# Patient Record
Sex: FEMALE | Race: WHITE | Employment: OTHER | URBAN - METROPOLITAN AREA
[De-identification: names, ages, dates, MRNs, and addresses within clinical notes are randomized per-mention and may not be internally consistent; named-entity substitution may affect disease eponyms.]

---

## 2018-10-15 PROBLEM — C78.02 SECONDARY MALIGNANT NEOPLASM OF LEFT LUNG (HCC): Status: ACTIVE | Noted: 2018-10-15

## 2018-10-15 PROBLEM — C34.11 MALIGNANT NEOPLASM OF UPPER LOBE OF RIGHT LUNG (HCC): Status: ACTIVE | Noted: 2018-10-15

## 2018-11-27 DIAGNOSIS — C34.11 MALIGNANT NEOPLASM OF UPPER LOBE OF RIGHT LUNG (HCC): ICD-10-CM

## 2018-11-27 LAB
ALBUMIN SERPL-MCNC: 4.6 G/DL (ref 3.9–4.9)
ALP BLD-CCNC: 63 U/L (ref 40–130)
ALT SERPL-CCNC: 20 U/L (ref 0–33)
ANION GAP SERPL CALCULATED.3IONS-SCNC: 14 MEQ/L (ref 7–13)
AST SERPL-CCNC: 17 U/L (ref 0–35)
BILIRUB SERPL-MCNC: <0.2 MG/DL (ref 0–1.2)
BUN BLDV-MCNC: 33 MG/DL (ref 8–23)
CALCIUM SERPL-MCNC: 10 MG/DL (ref 8.6–10.2)
CHLORIDE BLD-SCNC: 97 MEQ/L (ref 98–107)
CO2: 25 MEQ/L (ref 22–29)
CREAT SERPL-MCNC: 0.85 MG/DL (ref 0.5–0.9)
GFR AFRICAN AMERICAN: >60
GFR NON-AFRICAN AMERICAN: >60
GLOBULIN: 3.4 G/DL (ref 2.3–3.5)
GLUCOSE BLD-MCNC: 101 MG/DL (ref 74–109)
POTASSIUM SERPL-SCNC: 4.7 MEQ/L (ref 3.5–5.1)
SODIUM BLD-SCNC: 136 MEQ/L (ref 132–144)
TOTAL PROTEIN: 8 G/DL (ref 6.4–8.1)

## 2018-12-18 DIAGNOSIS — C34.11 MALIGNANT NEOPLASM OF UPPER LOBE OF RIGHT LUNG (HCC): ICD-10-CM

## 2018-12-18 DIAGNOSIS — C78.02 SECONDARY MALIGNANT NEOPLASM OF LEFT LUNG (HCC): ICD-10-CM

## 2018-12-18 LAB
ALBUMIN SERPL-MCNC: 4.3 G/DL (ref 3.9–4.9)
ALP BLD-CCNC: 57 U/L (ref 40–130)
ALT SERPL-CCNC: 26 U/L (ref 0–33)
ANION GAP SERPL CALCULATED.3IONS-SCNC: 15 MEQ/L (ref 7–13)
AST SERPL-CCNC: 19 U/L (ref 0–35)
BILIRUB SERPL-MCNC: <0.2 MG/DL (ref 0–1.2)
BUN BLDV-MCNC: 27 MG/DL (ref 8–23)
CALCIUM SERPL-MCNC: 9.3 MG/DL (ref 8.6–10.2)
CHLORIDE BLD-SCNC: 100 MEQ/L (ref 98–107)
CO2: 23 MEQ/L (ref 22–29)
CREAT SERPL-MCNC: 0.85 MG/DL (ref 0.5–0.9)
GFR AFRICAN AMERICAN: >60
GFR NON-AFRICAN AMERICAN: >60
GLOBULIN: 2.8 G/DL (ref 2.3–3.5)
GLUCOSE BLD-MCNC: 97 MG/DL (ref 74–109)
POTASSIUM SERPL-SCNC: 4.5 MEQ/L (ref 3.5–5.1)
SODIUM BLD-SCNC: 138 MEQ/L (ref 132–144)
TOTAL PROTEIN: 7.1 G/DL (ref 6.4–8.1)
TSH SERPL DL<=0.05 MIU/L-ACNC: 0.13 UIU/ML (ref 0.27–4.2)

## 2019-01-08 DIAGNOSIS — C34.11 MALIGNANT NEOPLASM OF UPPER LOBE OF RIGHT LUNG (HCC): ICD-10-CM

## 2019-01-08 DIAGNOSIS — C78.02 SECONDARY MALIGNANT NEOPLASM OF LEFT LUNG (HCC): ICD-10-CM

## 2019-01-08 LAB
ALBUMIN SERPL-MCNC: 4.4 G/DL (ref 3.9–4.9)
ALP BLD-CCNC: 58 U/L (ref 40–130)
ALT SERPL-CCNC: 23 U/L (ref 0–33)
ANION GAP SERPL CALCULATED.3IONS-SCNC: 19 MEQ/L (ref 7–13)
AST SERPL-CCNC: 16 U/L (ref 0–35)
BILIRUB SERPL-MCNC: <0.2 MG/DL (ref 0–1.2)
BUN BLDV-MCNC: 30 MG/DL (ref 8–23)
CALCIUM SERPL-MCNC: 9.5 MG/DL (ref 8.6–10.2)
CHLORIDE BLD-SCNC: 100 MEQ/L (ref 98–107)
CO2: 20 MEQ/L (ref 22–29)
CREAT SERPL-MCNC: 0.95 MG/DL (ref 0.5–0.9)
GFR AFRICAN AMERICAN: >60
GFR NON-AFRICAN AMERICAN: 59.5
GLOBULIN: 2.8 G/DL (ref 2.3–3.5)
GLUCOSE BLD-MCNC: 102 MG/DL (ref 74–109)
POTASSIUM SERPL-SCNC: 4.2 MEQ/L (ref 3.5–5.1)
SODIUM BLD-SCNC: 139 MEQ/L (ref 132–144)
TOTAL PROTEIN: 7.2 G/DL (ref 6.4–8.1)
TSH SERPL DL<=0.05 MIU/L-ACNC: 0.14 UIU/ML (ref 0.27–4.2)

## 2019-01-18 ENCOUNTER — HOSPITAL ENCOUNTER (OUTPATIENT)
Dept: CT IMAGING | Age: 64
Discharge: HOME | End: 2019-01-20
Payer: OTHER GOVERNMENT

## 2019-01-18 DIAGNOSIS — C34.11 MALIGNANT NEOPLASM OF UPPER LOBE OF RIGHT LUNG (HCC): ICD-10-CM

## 2019-01-18 DIAGNOSIS — C78.02 SECONDARY MALIGNANT NEOPLASM OF LEFT LUNG (HCC): ICD-10-CM

## 2019-01-18 PROCEDURE — 6360000004 HC RX CONTRAST MEDICATION: Performed by: INTERNAL MEDICINE

## 2019-01-18 PROCEDURE — 71260 CT THORAX DX C+: CPT

## 2019-01-18 RX ADMIN — IOPAMIDOL 75 ML: 612 INJECTION, SOLUTION INTRAVENOUS at 15:03

## 2019-01-29 DIAGNOSIS — C34.11 MALIGNANT NEOPLASM OF UPPER LOBE OF RIGHT LUNG (HCC): ICD-10-CM

## 2019-01-29 LAB
ALBUMIN SERPL-MCNC: 4.7 G/DL (ref 3.9–4.9)
ALP BLD-CCNC: 66 U/L (ref 40–130)
ALT SERPL-CCNC: 26 U/L (ref 0–33)
ANION GAP SERPL CALCULATED.3IONS-SCNC: 18 MEQ/L (ref 7–13)
AST SERPL-CCNC: <5 U/L (ref 0–35)
BILIRUB SERPL-MCNC: <0.2 MG/DL (ref 0–1.2)
BUN BLDV-MCNC: 30 MG/DL (ref 8–23)
CALCIUM SERPL-MCNC: 9.9 MG/DL (ref 8.6–10.2)
CHLORIDE BLD-SCNC: 97 MEQ/L (ref 98–107)
CO2: 22 MEQ/L (ref 22–29)
CREAT SERPL-MCNC: 0.95 MG/DL (ref 0.5–0.9)
GFR AFRICAN AMERICAN: >60
GFR NON-AFRICAN AMERICAN: 59.5
GLOBULIN: 2.8 G/DL (ref 2.3–3.5)
GLUCOSE BLD-MCNC: 80 MG/DL (ref 74–109)
POTASSIUM SERPL-SCNC: 4.7 MEQ/L (ref 3.5–5.1)
SODIUM BLD-SCNC: 137 MEQ/L (ref 132–144)
TOTAL PROTEIN: 7.5 G/DL (ref 6.4–8.1)

## 2019-02-19 DIAGNOSIS — C34.11 MALIGNANT NEOPLASM OF UPPER LOBE OF RIGHT LUNG (HCC): ICD-10-CM

## 2019-02-19 LAB
ALBUMIN SERPL-MCNC: 4.2 G/DL (ref 3.5–4.6)
ALP BLD-CCNC: 55 U/L (ref 40–130)
ALT SERPL-CCNC: 22 U/L (ref 0–33)
ANION GAP SERPL CALCULATED.3IONS-SCNC: 17 MEQ/L (ref 9–15)
AST SERPL-CCNC: 25 U/L (ref 0–35)
BILIRUB SERPL-MCNC: <0.2 MG/DL (ref 0.2–0.7)
BUN BLDV-MCNC: 33 MG/DL (ref 8–23)
CALCIUM SERPL-MCNC: 9.6 MG/DL (ref 8.5–9.9)
CHLORIDE BLD-SCNC: 97 MEQ/L (ref 95–107)
CO2: 22 MEQ/L (ref 20–31)
CREAT SERPL-MCNC: 0.83 MG/DL (ref 0.5–0.9)
GFR AFRICAN AMERICAN: >60
GFR NON-AFRICAN AMERICAN: >60
GLOBULIN: 3 G/DL (ref 2.3–3.5)
GLUCOSE BLD-MCNC: 92 MG/DL (ref 70–99)
POTASSIUM SERPL-SCNC: 4.3 MEQ/L (ref 3.4–4.9)
SODIUM BLD-SCNC: 136 MEQ/L (ref 135–144)
T4 FREE: 0.88 NG/DL (ref 0.84–1.68)
TOTAL PROTEIN: 7.2 G/DL (ref 6.3–8)

## 2019-05-07 DIAGNOSIS — C34.11 MALIGNANT NEOPLASM OF UPPER LOBE OF RIGHT LUNG (HCC): ICD-10-CM

## 2019-05-07 LAB
ALBUMIN SERPL-MCNC: 4.3 G/DL (ref 3.5–4.6)
ALP BLD-CCNC: 64 U/L (ref 40–130)
ALT SERPL-CCNC: 23 U/L (ref 0–33)
ANION GAP SERPL CALCULATED.3IONS-SCNC: 19 MEQ/L (ref 9–15)
AST SERPL-CCNC: 28 U/L (ref 0–35)
BILIRUB SERPL-MCNC: <0.2 MG/DL (ref 0.2–0.7)
BUN BLDV-MCNC: 21 MG/DL (ref 8–23)
CALCIUM SERPL-MCNC: 9.5 MG/DL (ref 8.5–9.9)
CHLORIDE BLD-SCNC: 99 MEQ/L (ref 95–107)
CO2: 22 MEQ/L (ref 20–31)
CREAT SERPL-MCNC: 0.9 MG/DL (ref 0.5–0.9)
GFR AFRICAN AMERICAN: >60
GFR NON-AFRICAN AMERICAN: >60
GLOBULIN: 2.9 G/DL (ref 2.3–3.5)
GLUCOSE BLD-MCNC: 94 MG/DL (ref 70–99)
POTASSIUM SERPL-SCNC: 4.6 MEQ/L (ref 3.4–4.9)
SODIUM BLD-SCNC: 140 MEQ/L (ref 135–144)
TOTAL PROTEIN: 7.2 G/DL (ref 6.3–8)
TSH SERPL DL<=0.05 MIU/L-ACNC: 1.31 UIU/ML (ref 0.44–3.86)

## 2019-05-09 ENCOUNTER — HOSPITAL ENCOUNTER (OUTPATIENT)
Dept: INFUSION THERAPY | Age: 64
Setting detail: INFUSION SERIES
Discharge: HOME | End: 2019-05-09
Payer: OTHER GOVERNMENT

## 2019-05-09 VITALS
TEMPERATURE: 97.9 F | SYSTOLIC BLOOD PRESSURE: 132 MMHG | RESPIRATION RATE: 16 BRPM | BODY MASS INDEX: 37.43 KG/M2 | HEIGHT: 62 IN | WEIGHT: 203.4 LBS | HEART RATE: 111 BPM | DIASTOLIC BLOOD PRESSURE: 63 MMHG

## 2019-05-09 DIAGNOSIS — C34.11 MALIGNANT NEOPLASM OF UPPER LOBE OF RIGHT LUNG (HCC): Primary | ICD-10-CM

## 2019-05-09 PROCEDURE — 2580000003 HC RX 258

## 2019-05-09 PROCEDURE — 6360000002 HC RX W HCPCS: Performed by: INTERNAL MEDICINE

## 2019-05-09 PROCEDURE — 2580000003 HC RX 258: Performed by: INTERNAL MEDICINE

## 2019-05-09 PROCEDURE — 96409 CHEMO IV PUSH SNGL DRUG: CPT

## 2019-05-09 PROCEDURE — 96375 TX/PRO/DX INJ NEW DRUG ADDON: CPT

## 2019-05-09 PROCEDURE — 96413 CHEMO IV INFUSION 1 HR: CPT

## 2019-05-09 RX ORDER — SODIUM CHLORIDE 0.9 % (FLUSH) 0.9 %
10 SYRINGE (ML) INJECTION PRN
Status: DISCONTINUED | OUTPATIENT
Start: 2019-05-09 | End: 2019-05-10 | Stop reason: HOSPADM

## 2019-05-09 RX ORDER — HEPARIN SODIUM (PORCINE) LOCK FLUSH IV SOLN 100 UNIT/ML 100 UNIT/ML
500 SOLUTION INTRAVENOUS PRN
Status: DISCONTINUED | OUTPATIENT
Start: 2019-05-09 | End: 2019-05-10 | Stop reason: HOSPADM

## 2019-05-09 RX ORDER — SODIUM CHLORIDE 9 MG/ML
INJECTION, SOLUTION INTRAVENOUS
Status: COMPLETED
Start: 2019-05-09 | End: 2019-05-09

## 2019-05-09 RX ORDER — SODIUM CHLORIDE 9 MG/ML
INJECTION, SOLUTION INTRAVENOUS ONCE
Status: COMPLETED | OUTPATIENT
Start: 2019-05-09 | End: 2019-05-09

## 2019-05-09 RX ADMIN — SODIUM CHLORIDE 200 MG: 9 INJECTION, SOLUTION INTRAVENOUS at 10:19

## 2019-05-09 RX ADMIN — SODIUM CHLORIDE: 9 INJECTION, SOLUTION INTRAVENOUS at 09:50

## 2019-05-09 RX ADMIN — SODIUM CHLORIDE 1000 MG: 9 INJECTION, SOLUTION INTRAVENOUS at 10:55

## 2019-05-09 RX ADMIN — DEXAMETHASONE SODIUM PHOSPHATE 12 MG: 4 INJECTION, SOLUTION INTRA-ARTICULAR; INTRALESIONAL; INTRAMUSCULAR; INTRAVENOUS; SOFT TISSUE at 09:50

## 2019-05-09 NOTE — PROGRESS NOTES
Pt here for infusion. Vitals stable. Consents signed and placed in chart. Denies any pain at this time.

## 2019-05-09 NOTE — PROGRESS NOTES
Pt  To [facility] ambulatory for first time chemo. States received info from [facility] on meds. Consents signed.

## 2019-05-21 ENCOUNTER — HOSPITAL ENCOUNTER (OUTPATIENT)
Dept: CT IMAGING | Age: 64
Discharge: HOME | End: 2019-05-23
Payer: OTHER GOVERNMENT

## 2019-05-21 DIAGNOSIS — C34.11 MALIGNANT NEOPLASM OF UPPER LOBE OF RIGHT LUNG (HCC): ICD-10-CM

## 2019-05-21 PROCEDURE — 71250 CT THORAX DX C-: CPT

## 2019-05-30 ENCOUNTER — HOSPITAL ENCOUNTER (OUTPATIENT)
Dept: INFUSION THERAPY | Age: 64
Setting detail: INFUSION SERIES
Discharge: HOME | End: 2019-05-30
Payer: OTHER GOVERNMENT

## 2019-05-30 VITALS
DIASTOLIC BLOOD PRESSURE: 64 MMHG | RESPIRATION RATE: 16 BRPM | TEMPERATURE: 98.5 F | SYSTOLIC BLOOD PRESSURE: 106 MMHG | HEART RATE: 85 BPM

## 2019-05-30 DIAGNOSIS — C34.11 MALIGNANT NEOPLASM OF UPPER LOBE OF RIGHT LUNG (HCC): Primary | ICD-10-CM

## 2019-05-30 PROCEDURE — 2580000003 HC RX 258

## 2019-05-30 PROCEDURE — 6360000002 HC RX W HCPCS: Performed by: INTERNAL MEDICINE

## 2019-05-30 PROCEDURE — 96413 CHEMO IV INFUSION 1 HR: CPT

## 2019-05-30 PROCEDURE — 96417 CHEMO IV INFUS EACH ADDL SEQ: CPT

## 2019-05-30 PROCEDURE — 96375 TX/PRO/DX INJ NEW DRUG ADDON: CPT

## 2019-05-30 PROCEDURE — 2580000003 HC RX 258: Performed by: INTERNAL MEDICINE

## 2019-05-30 RX ORDER — SODIUM CHLORIDE 9 MG/ML
INJECTION, SOLUTION INTRAVENOUS
Status: COMPLETED
Start: 2019-05-30 | End: 2019-05-30

## 2019-05-30 RX ORDER — SODIUM CHLORIDE 9 MG/ML
INJECTION, SOLUTION INTRAVENOUS ONCE
Status: COMPLETED | OUTPATIENT
Start: 2019-05-30 | End: 2019-05-30

## 2019-05-30 RX ORDER — SODIUM CHLORIDE 0.9 % (FLUSH) 0.9 %
10 SYRINGE (ML) INJECTION PRN
Status: DISCONTINUED | OUTPATIENT
Start: 2019-05-30 | End: 2019-05-31 | Stop reason: HOSPADM

## 2019-05-30 RX ADMIN — SODIUM CHLORIDE: 9 INJECTION, SOLUTION INTRAVENOUS at 14:11

## 2019-05-30 RX ADMIN — SODIUM CHLORIDE 200 MG: 9 INJECTION, SOLUTION INTRAVENOUS at 14:39

## 2019-05-30 RX ADMIN — DEXAMETHASONE SODIUM PHOSPHATE 12 MG: 10 INJECTION, SOLUTION INTRAMUSCULAR; INTRAVENOUS at 14:13

## 2019-05-30 RX ADMIN — SODIUM CHLORIDE 1000 MG: 9 INJECTION, SOLUTION INTRAVENOUS at 15:13

## 2019-06-18 ENCOUNTER — TELEPHONE (OUTPATIENT)
Dept: ADMINISTRATIVE | Age: 64
End: 2019-06-18

## 2019-06-21 ENCOUNTER — HOSPITAL ENCOUNTER (OUTPATIENT)
Dept: INFUSION THERAPY | Age: 64
Setting detail: INFUSION SERIES
Discharge: HOME | End: 2019-06-21
Payer: OTHER GOVERNMENT

## 2019-06-21 VITALS
SYSTOLIC BLOOD PRESSURE: 132 MMHG | DIASTOLIC BLOOD PRESSURE: 66 MMHG | HEART RATE: 95 BPM | RESPIRATION RATE: 18 BRPM | TEMPERATURE: 98.1 F

## 2019-06-21 DIAGNOSIS — C78.02 SECONDARY MALIGNANT NEOPLASM OF LEFT LUNG (HCC): ICD-10-CM

## 2019-06-21 DIAGNOSIS — C34.11 MALIGNANT NEOPLASM OF UPPER LOBE OF RIGHT LUNG (HCC): Primary | ICD-10-CM

## 2019-06-21 PROCEDURE — 96411 CHEMO IV PUSH ADDL DRUG: CPT

## 2019-06-21 PROCEDURE — 96413 CHEMO IV INFUSION 1 HR: CPT

## 2019-06-21 PROCEDURE — 6360000002 HC RX W HCPCS: Performed by: INTERNAL MEDICINE

## 2019-06-21 PROCEDURE — 2580000003 HC RX 258

## 2019-06-21 PROCEDURE — 2580000003 HC RX 258: Performed by: INTERNAL MEDICINE

## 2019-06-21 PROCEDURE — 96375 TX/PRO/DX INJ NEW DRUG ADDON: CPT

## 2019-06-21 RX ORDER — SODIUM CHLORIDE 9 MG/ML
INJECTION, SOLUTION INTRAVENOUS
Status: DISPENSED
Start: 2019-06-21 | End: 2019-06-22

## 2019-06-21 RX ORDER — SODIUM CHLORIDE 9 MG/ML
INJECTION, SOLUTION INTRAVENOUS ONCE
Status: COMPLETED | OUTPATIENT
Start: 2019-06-21 | End: 2019-06-21

## 2019-06-21 RX ORDER — SODIUM CHLORIDE 0.9 % (FLUSH) 0.9 %
10 SYRINGE (ML) INJECTION PRN
Status: DISCONTINUED | OUTPATIENT
Start: 2019-06-21 | End: 2019-06-22 | Stop reason: HOSPADM

## 2019-06-21 RX ADMIN — SODIUM CHLORIDE 200 MG: 9 INJECTION, SOLUTION INTRAVENOUS at 14:28

## 2019-06-21 RX ADMIN — DEXAMETHASONE SODIUM PHOSPHATE 12 MG: 4 INJECTION, SOLUTION INTRAMUSCULAR; INTRAVENOUS at 13:59

## 2019-06-21 RX ADMIN — SODIUM CHLORIDE: 9 INJECTION, SOLUTION INTRAVENOUS at 14:00

## 2019-06-21 RX ADMIN — SODIUM CHLORIDE 1000 MG: 9 INJECTION, SOLUTION INTRAVENOUS at 15:07

## 2019-07-17 ENCOUNTER — HOSPITAL ENCOUNTER (OUTPATIENT)
Dept: INFUSION THERAPY | Age: 64
Setting detail: INFUSION SERIES
Discharge: HOME | End: 2019-07-17
Payer: OTHER GOVERNMENT

## 2019-07-17 VITALS
RESPIRATION RATE: 18 BRPM | DIASTOLIC BLOOD PRESSURE: 70 MMHG | TEMPERATURE: 97.8 F | SYSTOLIC BLOOD PRESSURE: 153 MMHG | HEART RATE: 109 BPM

## 2019-07-17 DIAGNOSIS — C34.11 MALIGNANT NEOPLASM OF UPPER LOBE OF RIGHT LUNG (HCC): Primary | ICD-10-CM

## 2019-07-17 DIAGNOSIS — C78.02 SECONDARY MALIGNANT NEOPLASM OF LEFT LUNG (HCC): ICD-10-CM

## 2019-07-17 PROCEDURE — 96372 THER/PROPH/DIAG INJ SC/IM: CPT

## 2019-07-17 PROCEDURE — 96417 CHEMO IV INFUS EACH ADDL SEQ: CPT

## 2019-07-17 PROCEDURE — 96375 TX/PRO/DX INJ NEW DRUG ADDON: CPT

## 2019-07-17 PROCEDURE — 2580000003 HC RX 258: Performed by: INTERNAL MEDICINE

## 2019-07-17 PROCEDURE — 96413 CHEMO IV INFUSION 1 HR: CPT

## 2019-07-17 PROCEDURE — 6360000002 HC RX W HCPCS: Performed by: INTERNAL MEDICINE

## 2019-07-17 PROCEDURE — 2580000003 HC RX 258

## 2019-07-17 RX ORDER — SODIUM CHLORIDE 9 MG/ML
INJECTION, SOLUTION INTRAVENOUS
Status: COMPLETED
Start: 2019-07-17 | End: 2019-07-17

## 2019-07-17 RX ORDER — SODIUM CHLORIDE 0.9 % (FLUSH) 0.9 %
10 SYRINGE (ML) INJECTION PRN
Status: DISCONTINUED | OUTPATIENT
Start: 2019-07-17 | End: 2019-07-18 | Stop reason: HOSPADM

## 2019-07-17 RX ORDER — SODIUM CHLORIDE 9 MG/ML
INJECTION, SOLUTION INTRAVENOUS ONCE
Status: COMPLETED | OUTPATIENT
Start: 2019-07-17 | End: 2019-07-17

## 2019-07-17 RX ORDER — CYANOCOBALAMIN 1000 UG/ML
1000 INJECTION INTRAMUSCULAR; SUBCUTANEOUS ONCE
Status: COMPLETED | OUTPATIENT
Start: 2019-07-17 | End: 2019-07-17

## 2019-07-17 RX ADMIN — DEXAMETHASONE SODIUM PHOSPHATE 12 MG: 10 INJECTION, SOLUTION INTRAMUSCULAR; INTRAVENOUS at 13:25

## 2019-07-17 RX ADMIN — SODIUM CHLORIDE 250 ML: 9 INJECTION, SOLUTION INTRAVENOUS at 13:24

## 2019-07-17 RX ADMIN — CYANOCOBALAMIN 1000 MCG: 1000 INJECTION, SOLUTION INTRAMUSCULAR; SUBCUTANEOUS at 13:24

## 2019-07-17 RX ADMIN — SODIUM CHLORIDE 200 MG: 9 INJECTION, SOLUTION INTRAVENOUS at 14:20

## 2019-07-17 RX ADMIN — SODIUM CHLORIDE 1000 MG: 9 INJECTION, SOLUTION INTRAVENOUS at 14:52

## 2019-07-17 NOTE — FLOWSHEET NOTE
Patient to the floor ambulatory for her cycle 15 chemo. Vital signs taken. Denies any discomfort. Call light within reach.

## 2023-02-08 PROBLEM — M81.0 MENOPAUSAL OSTEOPOROSIS: Status: ACTIVE | Noted: 2023-02-08

## 2023-02-08 PROBLEM — K63.5 COLON POLYP: Status: ACTIVE | Noted: 2023-02-08

## 2023-02-08 PROBLEM — R68.89 FLU-LIKE SYMPTOMS: Status: ACTIVE | Noted: 2023-02-08

## 2023-02-08 PROBLEM — R07.81 RIB PAIN ON RIGHT SIDE: Status: ACTIVE | Noted: 2023-02-08

## 2023-02-08 PROBLEM — M54.6 PAIN IN THORACIC SPINE: Status: ACTIVE | Noted: 2023-02-08

## 2023-02-08 PROBLEM — C34.90 LUNG CANCER (MULTI): Status: ACTIVE | Noted: 2023-02-08

## 2023-02-08 PROBLEM — M16.11 ARTHRITIS OF RIGHT HIP: Status: ACTIVE | Noted: 2023-02-08

## 2023-02-08 PROBLEM — I10 HYPERTENSION: Status: ACTIVE | Noted: 2023-02-08

## 2023-02-08 PROBLEM — M25.559 PAIN IN HIP JOINT: Status: ACTIVE | Noted: 2023-02-08

## 2023-02-08 PROBLEM — K52.832 LYMPHOCYTIC COLITIS: Status: ACTIVE | Noted: 2023-02-08

## 2023-02-08 PROBLEM — R10.9 ABDOMINAL CRAMPS: Status: ACTIVE | Noted: 2023-02-08

## 2023-02-08 PROBLEM — M25.569 KNEE PAIN: Status: ACTIVE | Noted: 2023-02-08

## 2023-02-08 PROBLEM — B37.0 THRUSH: Status: ACTIVE | Noted: 2023-02-08

## 2023-02-08 PROBLEM — M54.16 LUMBAR RADICULOPATHY, RIGHT: Status: ACTIVE | Noted: 2023-02-08

## 2023-02-08 PROBLEM — M70.70 HIP BURSITIS: Status: ACTIVE | Noted: 2023-02-08

## 2023-02-08 PROBLEM — F41.9 ANXIETY: Status: ACTIVE | Noted: 2023-02-08

## 2023-02-08 PROBLEM — M19.90 ARTHRITIS: Status: ACTIVE | Noted: 2023-02-08

## 2023-02-08 PROBLEM — J30.9 ALLERGIC RHINITIS: Status: ACTIVE | Noted: 2023-02-08

## 2023-02-08 PROBLEM — M17.9 KNEE OSTEOARTHRITIS: Status: ACTIVE | Noted: 2023-02-08

## 2023-02-08 PROBLEM — E78.5 HYPERLIPIDEMIA: Status: ACTIVE | Noted: 2023-02-08

## 2023-02-08 PROBLEM — R55 SYNCOPE AND COLLAPSE: Status: ACTIVE | Noted: 2023-02-08

## 2023-02-08 PROBLEM — F32.A DEPRESSED: Status: ACTIVE | Noted: 2023-02-08

## 2023-02-08 PROBLEM — R91.1 LUNG NODULE: Status: ACTIVE | Noted: 2023-02-08

## 2023-02-08 PROBLEM — R05.8 RECURRENT COUGH: Status: ACTIVE | Noted: 2023-02-08

## 2023-02-08 PROBLEM — J44.9 COPD (CHRONIC OBSTRUCTIVE PULMONARY DISEASE) (MULTI): Status: ACTIVE | Noted: 2023-02-08

## 2023-02-08 PROBLEM — R19.7 DIARRHEA: Status: ACTIVE | Noted: 2023-02-08

## 2023-02-08 RX ORDER — FLUTICASONE PROPIONATE AND SALMETEROL XINAFOATE 230; 21 UG/1; UG/1
AEROSOL, METERED RESPIRATORY (INHALATION)
COMMUNITY
End: 2023-04-04 | Stop reason: SDUPTHER

## 2023-02-08 RX ORDER — ALBUTEROL SULFATE 90 UG/1
1 AEROSOL, METERED RESPIRATORY (INHALATION) EVERY 4 HOURS PRN
COMMUNITY
End: 2023-04-04 | Stop reason: SDUPTHER

## 2023-02-08 RX ORDER — FLUTICASONE FUROATE, UMECLIDINIUM BROMIDE AND VILANTEROL TRIFENATATE 200; 62.5; 25 UG/1; UG/1; UG/1
POWDER RESPIRATORY (INHALATION)
COMMUNITY
Start: 2021-09-27 | End: 2023-04-04 | Stop reason: SDUPTHER

## 2023-02-08 RX ORDER — MULTIVITAMIN
1 TABLET ORAL DAILY
COMMUNITY

## 2023-02-08 RX ORDER — PREDNISONE 20 MG/1
TABLET ORAL
COMMUNITY
Start: 2020-10-01 | End: 2023-03-17 | Stop reason: SDUPTHER

## 2023-02-08 RX ORDER — MONTELUKAST SODIUM 10 MG/1
1 TABLET ORAL DAILY
COMMUNITY
Start: 2021-03-23 | End: 2023-04-04 | Stop reason: SDUPTHER

## 2023-02-08 RX ORDER — ALPRAZOLAM 0.5 MG/1
1 TABLET ORAL 3 TIMES DAILY
COMMUNITY

## 2023-02-08 RX ORDER — ASPIRIN 81 MG/1
1 TABLET ORAL DAILY
COMMUNITY

## 2023-02-08 RX ORDER — LISINOPRIL AND HYDROCHLOROTHIAZIDE 12.5; 2 MG/1; MG/1
1 TABLET ORAL DAILY
COMMUNITY
End: 2023-04-04 | Stop reason: SDUPTHER

## 2023-02-08 RX ORDER — CETIRIZINE HYDROCHLORIDE 10 MG/1
1 TABLET ORAL DAILY
COMMUNITY
Start: 2019-07-31 | End: 2023-04-04 | Stop reason: SDUPTHER

## 2023-02-08 RX ORDER — AZITHROMYCIN 500 MG/1
TABLET, FILM COATED ORAL
COMMUNITY
Start: 2021-09-27 | End: 2023-04-04 | Stop reason: ALTCHOICE

## 2023-02-08 RX ORDER — DULOXETIN HYDROCHLORIDE 60 MG/1
CAPSULE, DELAYED RELEASE ORAL
COMMUNITY
End: 2023-04-04 | Stop reason: SDUPTHER

## 2023-02-08 RX ORDER — SIMVASTATIN 40 MG/1
1 TABLET, FILM COATED ORAL NIGHTLY
COMMUNITY
End: 2023-04-04 | Stop reason: SDUPTHER

## 2023-03-16 ENCOUNTER — TELEPHONE (OUTPATIENT)
Dept: PRIMARY CARE | Facility: CLINIC | Age: 68
End: 2023-03-16
Payer: MEDICARE

## 2023-03-16 DIAGNOSIS — J44.9 CHRONIC OBSTRUCTIVE PULMONARY DISEASE, UNSPECIFIED COPD TYPE (MULTI): Primary | ICD-10-CM

## 2023-03-17 RX ORDER — TRAZODONE HYDROCHLORIDE 100 MG/1
100 TABLET ORAL NIGHTLY
COMMUNITY
Start: 2023-02-09 | End: 2023-04-04 | Stop reason: ALTCHOICE

## 2023-03-17 RX ORDER — PREDNISONE 20 MG/1
TABLET ORAL
Qty: 10 TABLET | Refills: 0 | Status: SHIPPED | OUTPATIENT
Start: 2023-03-17 | End: 2023-04-04 | Stop reason: SDUPTHER

## 2023-03-17 RX ORDER — TRAZODONE HYDROCHLORIDE 50 MG/1
50 TABLET ORAL NIGHTLY
COMMUNITY
Start: 2023-03-09

## 2023-03-17 RX ORDER — TOPIRAMATE 25 MG/1
1 TABLET ORAL 2 TIMES DAILY
COMMUNITY
Start: 2023-02-12

## 2023-03-17 RX ORDER — TIOTROPIUM BROMIDE 18 UG/1
CAPSULE ORAL; RESPIRATORY (INHALATION)
COMMUNITY
End: 2023-04-04 | Stop reason: SDUPTHER

## 2023-03-21 ENCOUNTER — APPOINTMENT (OUTPATIENT)
Dept: PRIMARY CARE | Facility: CLINIC | Age: 68
End: 2023-03-21
Payer: MEDICARE

## 2023-03-21 NOTE — TELEPHONE ENCOUNTER
Pt not feeling well requesting ATB so she doesn't get Pneumonia as you are well aware of her history she states     Sent to front for Virtual -

## 2023-04-04 ENCOUNTER — OFFICE VISIT (OUTPATIENT)
Dept: PRIMARY CARE | Facility: CLINIC | Age: 68
End: 2023-04-04
Payer: MEDICARE

## 2023-04-04 VITALS
DIASTOLIC BLOOD PRESSURE: 68 MMHG | OXYGEN SATURATION: 97 % | HEART RATE: 97 BPM | HEIGHT: 62 IN | TEMPERATURE: 97 F | RESPIRATION RATE: 18 BRPM | WEIGHT: 163 LBS | SYSTOLIC BLOOD PRESSURE: 107 MMHG | BODY MASS INDEX: 30 KG/M2

## 2023-04-04 DIAGNOSIS — M81.0 MENOPAUSAL OSTEOPOROSIS: ICD-10-CM

## 2023-04-04 DIAGNOSIS — J44.9 CHRONIC OBSTRUCTIVE PULMONARY DISEASE, UNSPECIFIED COPD TYPE (MULTI): ICD-10-CM

## 2023-04-04 DIAGNOSIS — C34.11 MALIGNANT NEOPLASM OF UPPER LOBE OF RIGHT LUNG (MULTI): ICD-10-CM

## 2023-04-04 DIAGNOSIS — I10 PRIMARY HYPERTENSION: Primary | ICD-10-CM

## 2023-04-04 DIAGNOSIS — F32.A DEPRESSION, UNSPECIFIED DEPRESSION TYPE: ICD-10-CM

## 2023-04-04 DIAGNOSIS — R19.7 DIARRHEA, UNSPECIFIED TYPE: ICD-10-CM

## 2023-04-04 DIAGNOSIS — Z12.31 BREAST CANCER SCREENING BY MAMMOGRAM: ICD-10-CM

## 2023-04-04 DIAGNOSIS — E78.5 HYPERLIPIDEMIA, UNSPECIFIED HYPERLIPIDEMIA TYPE: ICD-10-CM

## 2023-04-04 DIAGNOSIS — J30.9 ALLERGIC RHINITIS, UNSPECIFIED SEASONALITY, UNSPECIFIED TRIGGER: ICD-10-CM

## 2023-04-04 DIAGNOSIS — H60.399 ACUTE BACTERIAL OTITIS EXTERNA: ICD-10-CM

## 2023-04-04 DIAGNOSIS — M54.16 LUMBAR RADICULOPATHY, RIGHT: ICD-10-CM

## 2023-04-04 DIAGNOSIS — K63.5 POLYP OF COLON, UNSPECIFIED PART OF COLON, UNSPECIFIED TYPE: ICD-10-CM

## 2023-04-04 PROBLEM — C78.02 SECONDARY MALIGNANT NEOPLASM OF LEFT LUNG (MULTI): Status: ACTIVE | Noted: 2018-10-15

## 2023-04-04 PROCEDURE — G0009 ADMIN PNEUMOCOCCAL VACCINE: HCPCS | Performed by: FAMILY MEDICINE

## 2023-04-04 PROCEDURE — 99214 OFFICE O/P EST MOD 30 MIN: CPT | Performed by: FAMILY MEDICINE

## 2023-04-04 PROCEDURE — 1160F RVW MEDS BY RX/DR IN RCRD: CPT | Performed by: FAMILY MEDICINE

## 2023-04-04 PROCEDURE — 1159F MED LIST DOCD IN RCRD: CPT | Performed by: FAMILY MEDICINE

## 2023-04-04 PROCEDURE — 3078F DIAST BP <80 MM HG: CPT | Performed by: FAMILY MEDICINE

## 2023-04-04 PROCEDURE — 3074F SYST BP LT 130 MM HG: CPT | Performed by: FAMILY MEDICINE

## 2023-04-04 PROCEDURE — 90677 PCV20 VACCINE IM: CPT | Performed by: FAMILY MEDICINE

## 2023-04-04 RX ORDER — TRAZODONE HYDROCHLORIDE 50 MG/1
50 TABLET ORAL NIGHTLY
Qty: 90 TABLET | Refills: 3 | Status: CANCELLED | OUTPATIENT
Start: 2023-04-04

## 2023-04-04 RX ORDER — SIMVASTATIN 40 MG/1
40 TABLET, FILM COATED ORAL NIGHTLY
Qty: 90 TABLET | Refills: 3 | Status: SHIPPED | OUTPATIENT
Start: 2023-04-04

## 2023-04-04 RX ORDER — PREDNISONE 20 MG/1
20 TABLET ORAL
Qty: 10 TABLET | Refills: 2 | Status: SHIPPED | OUTPATIENT
Start: 2023-04-04 | End: 2023-10-26 | Stop reason: ALTCHOICE

## 2023-04-04 RX ORDER — FLUTICASONE FUROATE, UMECLIDINIUM BROMIDE AND VILANTEROL TRIFENATATE 200; 62.5; 25 UG/1; UG/1; UG/1
1 POWDER RESPIRATORY (INHALATION) DAILY
Qty: 90 EACH | Refills: 3 | Status: SHIPPED | OUTPATIENT
Start: 2023-04-04 | End: 2023-07-10 | Stop reason: SDUPTHER

## 2023-04-04 RX ORDER — ALBUTEROL SULFATE 90 UG/1
1 AEROSOL, METERED RESPIRATORY (INHALATION) EVERY 4 HOURS PRN
Qty: 18 G | Refills: 3 | Status: SHIPPED | OUTPATIENT
Start: 2023-04-04

## 2023-04-04 RX ORDER — ACETIC ACID 20.65 MG/ML
3 SOLUTION AURICULAR (OTIC) 3 TIMES DAILY
Qty: 15 ML | Refills: 2 | Status: SHIPPED | OUTPATIENT
Start: 2023-04-04 | End: 2024-01-02

## 2023-04-04 RX ORDER — LISINOPRIL AND HYDROCHLOROTHIAZIDE 12.5; 2 MG/1; MG/1
1 TABLET ORAL DAILY
Qty: 90 TABLET | Refills: 3 | Status: SHIPPED | OUTPATIENT
Start: 2023-04-04

## 2023-04-04 RX ORDER — CETIRIZINE HYDROCHLORIDE 10 MG/1
10 TABLET ORAL DAILY
Qty: 90 TABLET | Refills: 3 | Status: SHIPPED | OUTPATIENT
Start: 2023-04-04

## 2023-04-04 RX ORDER — TIOTROPIUM BROMIDE 18 UG/1
1 CAPSULE ORAL; RESPIRATORY (INHALATION)
Qty: 90 CAPSULE | Refills: 3 | Status: SHIPPED | OUTPATIENT
Start: 2023-04-04 | End: 2023-07-10

## 2023-04-04 RX ORDER — ALPRAZOLAM 0.5 MG/1
0.5 TABLET ORAL 3 TIMES DAILY
Qty: 7 TABLET | Refills: 3 | Status: CANCELLED | OUTPATIENT
Start: 2023-04-04

## 2023-04-04 RX ORDER — FLUTICASONE PROPIONATE AND SALMETEROL XINAFOATE 230; 21 UG/1; UG/1
2 AEROSOL, METERED RESPIRATORY (INHALATION)
Qty: 12 G | Refills: 3 | Status: SHIPPED | OUTPATIENT
Start: 2023-04-04

## 2023-04-04 RX ORDER — TOPIRAMATE 25 MG/1
25 TABLET ORAL 2 TIMES DAILY
Qty: 180 TABLET | Refills: 3 | Status: CANCELLED | OUTPATIENT
Start: 2023-04-04

## 2023-04-04 RX ORDER — DULOXETIN HYDROCHLORIDE 60 MG/1
60 CAPSULE, DELAYED RELEASE ORAL DAILY
Qty: 90 CAPSULE | Refills: 3 | Status: CANCELLED | OUTPATIENT
Start: 2023-04-04 | End: 2024-04-03

## 2023-04-04 RX ORDER — ALBUTEROL SULFATE 0.83 MG/ML
2.5 SOLUTION RESPIRATORY (INHALATION) EVERY 6 HOURS PRN
Qty: 180 ML | Refills: 3 | Status: SHIPPED | OUTPATIENT
Start: 2023-04-04 | End: 2024-04-03

## 2023-04-04 RX ORDER — AZITHROMYCIN 500 MG/1
500 TABLET, FILM COATED ORAL DAILY
Qty: 5 TABLET | Refills: 1 | Status: SHIPPED | OUTPATIENT
Start: 2023-04-04 | End: 2023-10-26 | Stop reason: ALTCHOICE

## 2023-04-04 RX ORDER — DULOXETIN HYDROCHLORIDE 60 MG/1
60 CAPSULE, DELAYED RELEASE ORAL DAILY
Qty: 90 CAPSULE | Refills: 3 | Status: SHIPPED | OUTPATIENT
Start: 2023-04-04 | End: 2023-07-10 | Stop reason: SDUPTHER

## 2023-04-04 RX ORDER — MONTELUKAST SODIUM 10 MG/1
10 TABLET ORAL DAILY
Qty: 90 TABLET | Refills: 3 | Status: SHIPPED | OUTPATIENT
Start: 2023-04-04

## 2023-04-04 ASSESSMENT — ENCOUNTER SYMPTOMS
DYSURIA: 0
SEIZURES: 0
DIARRHEA: 0
CONSTIPATION: 0
AGITATION: 0
POLYDIPSIA: 0
UNEXPECTED WEIGHT CHANGE: 0
FEVER: 0
CHEST TIGHTNESS: 0
DIFFICULTY URINATING: 0
POLYPHAGIA: 0
SHORTNESS OF BREATH: 1
ARTHRALGIAS: 0
PALPITATIONS: 0
WHEEZING: 1
ABDOMINAL DISTENTION: 0
LIGHT-HEADEDNESS: 0
SINUS PAIN: 0
ADENOPATHY: 0
BLOOD IN STOOL: 0
SPEECH DIFFICULTY: 0
CHILLS: 0
NECK PAIN: 0
BRUISES/BLEEDS EASILY: 0
DIZZINESS: 0
CONFUSION: 0
EYE REDNESS: 0
TROUBLE SWALLOWING: 0
NERVOUS/ANXIOUS: 0

## 2023-04-04 NOTE — PROGRESS NOTES
Subjective   Patient ID: Idania Orozco is a 67 y.o. female who presents for Follow-up (Check up/).    HPI   Patient is also here for follow-up of hypertension.. Symptoms do not include headache confusion visual disturbance dizziness shortness of breath chest pain syncope or palpitations. Recent blood pressure patient has  been checking.120/70 By report there is good compliance with treatment. Pertinent medical history includes  hyperlipidemia   Patient is also here for follow-up of hyperlipidemia. The most recent measurements for this patient would take I???   triglycerides       .... Associated symptoms do not include chest pain, Cramps with exertion, myalgias or nausea. Current treatment includes statins. By report there is good compliance with treatment. Pertinent medical history includes  hypertension   HERE  FROR F/U   OF COPD .Management last visit....   Symptoms include dyspnea and nonproductive cough. Onset was gradual year (S) ago. The symptoms occur constantly. The episodes occur daily and are described as moderate in severity and resolve. Associated symptoms do not include fever hemoptysis or leg edema. Current treatment includes Albuterol and long-acting steroids and ipratropium .. years      Review of Systems   Constitutional:  Negative for chills, fever and unexpected weight change.   HENT:  Positive for ear pain. Negative for congestion, hearing loss, nosebleeds, sinus pain and trouble swallowing.    Eyes:  Negative for redness and visual disturbance.   Respiratory:  Positive for shortness of breath and wheezing. Negative for chest tightness.    Cardiovascular:  Negative for chest pain and palpitations.   Gastrointestinal:  Negative for abdominal distention, blood in stool, constipation and diarrhea.   Endocrine: Negative for cold intolerance, heat intolerance, polydipsia, polyphagia and polyuria.   Genitourinary:  Negative for difficulty urinating, dysuria and urgency.   Musculoskeletal:   "Negative for arthralgias and neck pain.   Skin:  Negative for rash.   Neurological:  Negative for dizziness, seizures, syncope, speech difficulty and light-headedness.   Hematological:  Negative for adenopathy. Does not bruise/bleed easily.   Psychiatric/Behavioral:  Negative for agitation and confusion. The patient is not nervous/anxious.        Objective   /68   Pulse 97   Temp 36.1 °C (97 °F)   Resp 18   Ht 1.575 m (5' 2\")   Wt 73.9 kg (163 lb)   SpO2 97%   BMI 29.81 kg/m²     Physical Exam  Vitals reviewed.   Constitutional:       Appearance: Normal appearance.   HENT:      Head: Normocephalic.      Comments: Erythema of the external auditory canal     Right Ear: External ear normal.      Left Ear: External ear normal.      Nose: Nose normal.      Mouth/Throat:      Mouth: Mucous membranes are moist.   Eyes:      Conjunctiva/sclera: Conjunctivae normal.   Cardiovascular:      Rate and Rhythm: Regular rhythm.      Heart sounds: Normal heart sounds.   Pulmonary:      Effort: Pulmonary effort is normal.      Breath sounds: Wheezing present.   Abdominal:      General: Bowel sounds are normal.      Palpations: Abdomen is soft.   Musculoskeletal:         General: Normal range of motion.      Cervical back: Neck supple.   Skin:     General: Skin is warm and dry.   Neurological:      General: No focal deficit present.      Mental Status: She is alert and oriented to person, place, and time.   Psychiatric:         Behavior: Behavior normal.         Judgment: Judgment normal.     Colonoscopy dated 3/4/2021 showed 5 mm polyp in the descending colon few small mouth diverticuli found in the sigmoid colon repeat colonoscopy in 5 years echocardiogram dated 11/20 showed left ventricular systolic function is normal left atrium is mildly dilated there is no evidence of aortic valve regurgitation mitral valve is normal.  Relaxation of the left ventricular diastolic filling was noted with mild to moderate tricuspid " regurgitation    CT of the chest dated 11/30/2022 showed hypodense right thyroid nodule measuring 14 x 12 mm not significantly changed no mass or pneumonia in either lung noted stable left upper lobe nodule measuring 3 mm in diameter scant mediastinal adenopathy with lymph nodes measuring 10 mm or less  Assessment/Plan   Problem List Items Addressed This Visit          Nervous    Lumbar radiculopathy, right    Relevant Medications    DULoxetine (Cymbalta) 60 mg DR capsule       Respiratory    COPD (chronic obstructive pulmonary disease) (CMS/HCC)    Relevant Medications    albuterol 90 mcg/actuation inhaler    tiotropium (Spiriva with HandiHaler) 18 mcg inhalation capsule    montelukast (Singulair) 10 mg tablet    fluticasone-umeclidin-vilanter (Trelegy Ellipta) 200-62.5-25 mcg blister with device    fluticasone propion-salmeteroL (Advair HFA) 230-21 mcg/actuation inhaler    albuterol 2.5 mg /3 mL (0.083 %) nebulizer solution    azithromycin (Zithromax) 500 mg tablet    predniSONE (Deltasone) 20 mg tablet    Malignant neoplasm of upper lobe of right lung (CMS/HCC)       light of his history of's lung cancers discussed smoking cessation and encouraged consider nicotine lozenges            Circulatory    Hypertension - Primary     Patient is also here for follow-up of hypertension.. Symptoms do not include headache confusion visual disturbance dizziness shortness of breath chest pain syncope or palpitations. Recent blood pressure patient has  been checking. stable and continue meds          Relevant Medications    lisinopriL-hydrochlorothiazide 20-12.5 mg tablet       Digestive    Colon polyp     Colonoscopy reviewed see below repeat 2026, and diverticuli would recommend history use of Metamucil daily.  And avoid peanuts              Musculoskeletal    Menopausal osteoporosis    Relevant Orders    XR DEXA bone density       Other    Allergic rhinitis    Relevant Medications    cetirizine (ZyrTEC) 10 mg tablet     Diarrhea     In light of bowel movement change consider adding probiotic or FODMAP diet handout given         Relevant Orders    CBC and Auto Differential    Comprehensive Metabolic Panel    Hyperlipidemia     With review of the chart it seems like the lipid review has been quite sometime ago so we will order repeat fasting labs encourage you to get those    done   . ensure stability.  Call with results         Relevant Medications    simvastatin (Zocor) 40 mg tablet    Other Relevant Orders    Lipid Panel    Depression     Other Visit Diagnoses       Breast cancer screening by mammogram        Relevant Orders    BI mammo bilateral screening tomosynthesis    Acute bacterial otitis externa        Relevant Medications    acetic acid (Vosol) 2 % otic solution

## 2023-04-04 NOTE — ASSESSMENT & PLAN NOTE
light of his history of's lung cancers discussed smoking cessation and encouraged consider nicotine lozenges

## 2023-04-04 NOTE — PATIENT INSTRUCTIONS
Please consider exercise program involving walking or some other form of aerobic activity 5 days weekly for 30 minutes... Let's also consider strengthening of large muscle groups like the abdominal muscles or shoulder muscles... Twice weekly with reps of 5/10/15 exercises and gradually increase strength.. This is not heavy strength training but light weight training... Sit ups or back exercise routine.. Please ask for handout if uncertain how to do..This  will help to strengthen your muscles which in turn will help you to lose weight.... You might ask what is the best diet available.. I would strongly encourage you to consider  Weight Watchers.. And as  your  fellow on  Weight Watchers physician attempting to  live this  LIFE  style  choice with you....  I will be glad to give you recommendations on what to eat.. Consider buying Allison bread.., jayden bagle thin bread.. oikos yogurt... eggs  to eat as hard-boiled... Halo top ice cream for snack... All these are delicious foods which.. when eaten and  being compliant eating three  meals daily  breakfast lunch and dinner, drinking  64 ounces of water daily we will all win together !!!!!!!. This will be a means for you to lose weight... Consider also the smart phone raffaele ... My plate.. Or My  fitness  pal..,  as additional possibilities for weight loss... Good  luckarl Vasquez!    Discussed medication side effects.  The  risk benefits and treatment options  discussed with patient.  Better or so we added his name at    Please schedule follow-up appointment based upon your improvement/failure to improve/chronic medical conditions and physician recommendations during office appointment at the .  For lesion, open ended    Patient advised to go to er if symptoms worsen or to call answering service, or to return to office for additional evaluation    This note was partially  generated using Dragon voice recognition and there may be incorrect words, wording,  spelling, or pronunciation errors that were not corrected prior to committing the note to the medical record.       Manage cravings  Cravings for tobacco happen more often and it     may  take  days and weeks to  quit. The longer you go without smoking, the fewer   urges you  will have. fighting cravings can be easier if you have a plan  Know you're triggers  Make a list of things to try when the urge to smoke hits. Find what works best for you.  Keep your hands busy ...  plan an alternative  instead of picking up a cigarette. Keep hard candies, fruits and vegetables with you.  Problem List Items Addressed This Visit          Nervous    Lumbar radiculopathy, right    Relevant Medications    DULoxetine (Cymbalta) 60 mg DR capsule       Respiratory    COPD (chronic obstructive pulmonary disease) (CMS/[facility])    Relevant Medications    albuterol 90 mcg/actuation inhaler    tiotropium (Spiriva with HandiHaler) 18 mcg inhalation capsule    montelukast (Singulair) 10 mg tablet    fluticasone-umeclidin-vilanter (Trelegy Ellipta) 200-62.5-25 mcg blister with device    fluticasone propion-salmeteroL (Advair HFA) 230-21 mcg/actuation inhaler    albuterol 2.5 mg /3 mL (0.083 %) nebulizer solution    azithromycin (Zithromax) 500 mg tablet    predniSONE (Deltasone) 20 mg tablet    Malignant neoplasm of upper lobe of right lung (CMS/HCC)       light of his history of's lung cancers discussed smoking cessation and encouraged consider nicotine lozenges            Circulatory    Hypertension - Primary     Patient is also here for follow-up of hypertension.. Symptoms do not include headache confusion visual disturbance dizziness shortness of breath chest pain syncope or palpitations. Recent blood pressure patient has  been checking. stable and continue meds          Relevant Medications    lisinopriL-hydrochlorothiazide 20-12.5 mg tablet       Digestive    Colon polyp     Colonoscopy reviewed see below repeat 2026, and diverticuli would  recommend history use of Metamucil daily.  And avoid peanuts              Musculoskeletal    Menopausal osteoporosis    Relevant Orders    XR DEXA bone density       Other    Allergic rhinitis    Relevant Medications    cetirizine (ZyrTEC) 10 mg tablet    Diarrhea     In light of bowel movement change consider adding probiotic or FODMAP diet handout given         Relevant Orders    CBC and Auto Differential    Comprehensive Metabolic Panel    Hyperlipidemia     With review of the chart it seems like the lipid review has been quite sometime ago so we will order repeat fasting labs encourage you to get those    done   . ensure stability.  Call with results         Relevant Medications    simvastatin (Zocor) 40 mg tablet    Other Relevant Orders    Lipid Panel    Depression     Other Visit Diagnoses       Breast cancer screening by mammogram        Relevant Orders    BI mammo bilateral screening tomosynthesis    Acute bacterial otitis externa        Relevant Medications    acetic acid (Vosol) 2 % otic solution            Drink more water . this helps 2+ to    flush nicotine out of your system faster. It also also reduces   that in the moment craving.  .  Keep moving. Try going for a walk or jog. Go up and down the stairs a few times. Take  dog for a walk. Physical activity even in  short bursts can help with her energy and nicotine craving.  Take slow deep breaths, refill your craving. Slowly inhale through your nose and exhale thruh your mouth. Repeat this 10 times or 2 you're feeling more relaxed.  Stay motivated.  Quitting as a process. Take it one day at a time. The first hours, days and weeks without she was can be hard. Keep a positive outlook can help you get through.  .   IT can take a person an average of 7 times before successfully quitting. Remind herself of all reason to have STARTED . Stay away from situations STIMULATING YOU  to make you want to smoke.  Reward YOURSELF  along the way celebrates your  success using money saved on expensive tobacco products to buy something special. Enjoying outdoor height now that you can breathe easier. Enjoying the fact that YOU NO longer smoke tobacco. Notice that food taste better when you're not smoking or chewing tobacco.  Prevent weight gain  Not everyone will gain weight when they quit smoking. The average waking can be less than 10 pounds. To help prevent weight gain:  8 plenty of fruits and vegetables.    Adequate diet. Plan water it will help keep you fall and persistent. Get MORE  sleep. Exercise go for walks at nighttime and daytime WHEN YOUu would otherwise have cigarette

## 2023-04-04 NOTE — ASSESSMENT & PLAN NOTE
Patient is also here for follow-up of hypertension.. Symptoms do not include headache confusion visual disturbance dizziness shortness of breath chest pain syncope or palpitations. Recent blood pressure patient has  been checking. stable and continue meds

## 2023-04-04 NOTE — ASSESSMENT & PLAN NOTE
Colonoscopy reviewed see below repeat 2026, and diverticuli would recommend history use of Metamucil daily.  And avoid peanuts

## 2023-04-04 NOTE — ASSESSMENT & PLAN NOTE
With review of the chart it seems like the lipid review has been quite sometime ago so we will order repeat fasting labs encourage you to get those    done   . ensure stability.  Call with results

## 2023-07-10 DIAGNOSIS — M54.16 LUMBAR RADICULOPATHY, RIGHT: ICD-10-CM

## 2023-07-10 DIAGNOSIS — J44.9 CHRONIC OBSTRUCTIVE PULMONARY DISEASE, UNSPECIFIED COPD TYPE (MULTI): ICD-10-CM

## 2023-07-10 NOTE — TELEPHONE ENCOUNTER
Pt's insurance will not pay for Spiriva, she doesn't want to take anymore - Only Trelegy   I updated her request when she walked in today and also last refill on Duloxetine was sent to wrong pharmacy so I updated that as well  There is also I paper I will keep for you to check off as well stating Only fill Trelegy

## 2023-07-11 RX ORDER — DULOXETIN HYDROCHLORIDE 60 MG/1
60 CAPSULE, DELAYED RELEASE ORAL DAILY
Qty: 90 CAPSULE | Refills: 1 | Status: SHIPPED | OUTPATIENT
Start: 2023-07-11 | End: 2024-06-10 | Stop reason: SDUPTHER

## 2023-07-11 RX ORDER — FLUTICASONE FUROATE, UMECLIDINIUM BROMIDE AND VILANTEROL TRIFENATATE 200; 62.5; 25 UG/1; UG/1; UG/1
1 POWDER RESPIRATORY (INHALATION) DAILY
Qty: 90 EACH | Refills: 3 | Status: SHIPPED | OUTPATIENT
Start: 2023-07-11 | End: 2024-07-10

## 2023-10-17 ENCOUNTER — APPOINTMENT (OUTPATIENT)
Dept: PRIMARY CARE | Facility: CLINIC | Age: 68
End: 2023-10-17
Payer: MEDICARE

## 2023-10-26 ENCOUNTER — OFFICE VISIT (OUTPATIENT)
Dept: PRIMARY CARE | Facility: CLINIC | Age: 68
End: 2023-10-26
Payer: MEDICARE

## 2023-10-26 VITALS
BODY MASS INDEX: 27.42 KG/M2 | DIASTOLIC BLOOD PRESSURE: 60 MMHG | WEIGHT: 149 LBS | HEART RATE: 75 BPM | OXYGEN SATURATION: 96 % | RESPIRATION RATE: 18 BRPM | HEIGHT: 62 IN | SYSTOLIC BLOOD PRESSURE: 110 MMHG | TEMPERATURE: 97 F

## 2023-10-26 DIAGNOSIS — D50.9 IRON DEFICIENCY ANEMIA, UNSPECIFIED IRON DEFICIENCY ANEMIA TYPE: ICD-10-CM

## 2023-10-26 DIAGNOSIS — E78.5 HYPERLIPIDEMIA, UNSPECIFIED HYPERLIPIDEMIA TYPE: ICD-10-CM

## 2023-10-26 DIAGNOSIS — Z23 NEED FOR VACCINATION: ICD-10-CM

## 2023-10-26 DIAGNOSIS — J44.9 CHRONIC OBSTRUCTIVE PULMONARY DISEASE, UNSPECIFIED COPD TYPE (MULTI): ICD-10-CM

## 2023-10-26 DIAGNOSIS — I10 HYPERTENSION, UNSPECIFIED TYPE: ICD-10-CM

## 2023-10-26 DIAGNOSIS — Z00.00 ROUTINE GENERAL MEDICAL EXAMINATION AT HEALTH CARE FACILITY: Primary | ICD-10-CM

## 2023-10-26 DIAGNOSIS — Z13.6 SCREENING FOR CARDIOVASCULAR CONDITION: ICD-10-CM

## 2023-10-26 DIAGNOSIS — Z12.31 ENCOUNTER FOR SCREENING MAMMOGRAM FOR BREAST CANCER: ICD-10-CM

## 2023-10-26 DIAGNOSIS — R42 DIZZINESS AND GIDDINESS: ICD-10-CM

## 2023-10-26 DIAGNOSIS — M19.90 ARTHRITIS: ICD-10-CM

## 2023-10-26 DIAGNOSIS — Z13.1 DIABETES MELLITUS SCREENING: ICD-10-CM

## 2023-10-26 DIAGNOSIS — Z78.0 ASYMPTOMATIC MENOPAUSAL STATE: ICD-10-CM

## 2023-10-26 PROBLEM — H02.831 DERMATOCHALASIS OF BOTH UPPER EYELIDS: Status: ACTIVE | Noted: 2020-06-22

## 2023-10-26 PROBLEM — H02.834 DERMATOCHALASIS OF BOTH UPPER EYELIDS: Status: ACTIVE | Noted: 2020-06-22

## 2023-10-26 PROBLEM — F17.210 CIGARETTE SMOKER: Status: ACTIVE | Noted: 2017-03-06

## 2023-10-26 PROBLEM — Z85.118 HISTORY OF LUNG CANCER: Status: ACTIVE | Noted: 2023-10-26

## 2023-10-26 PROBLEM — R07.89 CHEST HEAVINESS: Status: ACTIVE | Noted: 2017-03-06

## 2023-10-26 PROBLEM — H04.123 DRY EYE SYNDROME OF BILATERAL LACRIMAL GLANDS: Status: ACTIVE | Noted: 2020-06-22

## 2023-10-26 PROBLEM — E66.01 MORBID OBESITY (MULTI): Status: ACTIVE | Noted: 2017-03-06

## 2023-10-26 PROBLEM — Z96.1 PSEUDOPHAKIA OF BOTH EYES: Status: ACTIVE | Noted: 2020-07-01

## 2023-10-26 PROBLEM — R06.09 DOE (DYSPNEA ON EXERTION): Status: ACTIVE | Noted: 2017-03-06

## 2023-10-26 LAB — POC HEMOGLOBIN A1C: 5 % (ref 4.2–6.5)

## 2023-10-26 PROCEDURE — 3074F SYST BP LT 130 MM HG: CPT | Performed by: FAMILY MEDICINE

## 2023-10-26 PROCEDURE — 1160F RVW MEDS BY RX/DR IN RCRD: CPT | Performed by: FAMILY MEDICINE

## 2023-10-26 PROCEDURE — 3078F DIAST BP <80 MM HG: CPT | Performed by: FAMILY MEDICINE

## 2023-10-26 PROCEDURE — 1170F FXNL STATUS ASSESSED: CPT | Performed by: FAMILY MEDICINE

## 2023-10-26 PROCEDURE — 90662 IIV NO PRSV INCREASED AG IM: CPT | Performed by: FAMILY MEDICINE

## 2023-10-26 PROCEDURE — 83036 HEMOGLOBIN GLYCOSYLATED A1C: CPT | Performed by: FAMILY MEDICINE

## 2023-10-26 PROCEDURE — 99214 OFFICE O/P EST MOD 30 MIN: CPT | Performed by: FAMILY MEDICINE

## 2023-10-26 PROCEDURE — G0008 ADMIN INFLUENZA VIRUS VAC: HCPCS | Performed by: FAMILY MEDICINE

## 2023-10-26 PROCEDURE — G0439 PPPS, SUBSEQ VISIT: HCPCS | Performed by: FAMILY MEDICINE

## 2023-10-26 PROCEDURE — 1159F MED LIST DOCD IN RCRD: CPT | Performed by: FAMILY MEDICINE

## 2023-10-26 RX ORDER — PREDNISONE 20 MG/1
20 TABLET ORAL
Qty: 10 TABLET | Refills: 2 | Status: SHIPPED | OUTPATIENT
Start: 2023-10-26 | End: 2024-01-02

## 2023-10-26 RX ORDER — AZITHROMYCIN 500 MG/1
500 TABLET, FILM COATED ORAL DAILY
Qty: 5 TABLET | Refills: 3 | Status: SHIPPED | OUTPATIENT
Start: 2023-10-26 | End: 2024-01-02

## 2023-10-26 ASSESSMENT — PATIENT HEALTH QUESTIONNAIRE - PHQ9
SUM OF ALL RESPONSES TO PHQ9 QUESTIONS 1 AND 2: 0
2. FEELING DOWN, DEPRESSED OR HOPELESS: NOT AT ALL
1. LITTLE INTEREST OR PLEASURE IN DOING THINGS: NOT AT ALL

## 2023-10-26 ASSESSMENT — ENCOUNTER SYMPTOMS
DEPRESSION: 0
OCCASIONAL FEELINGS OF UNSTEADINESS: 0
LOSS OF SENSATION IN FEET: 0

## 2023-10-26 ASSESSMENT — ACTIVITIES OF DAILY LIVING (ADL)
TAKING_MEDICATION: INDEPENDENT
DOING_HOUSEWORK: INDEPENDENT
GROCERY_SHOPPING: INDEPENDENT
DRESSING: INDEPENDENT
MANAGING_FINANCES: INDEPENDENT
BATHING: INDEPENDENT

## 2023-10-26 NOTE — PROGRESS NOTES
"Subjective   Patient ID: Idania Orozco is a 68 y.o. female who presents for Medicare Annual Wellness Visit Subsequent.    HPI     Review of Systems    Objective   BP 94/60   Pulse 75   Temp 36.1 °C (97 °F)   Resp 18   Ht 1.575 m (5' 2\")   Wt 67.6 kg (149 lb)   SpO2 96%   BMI 27.25 kg/m²     Physical Exam    Assessment/Plan          "

## 2023-10-26 NOTE — ASSESSMENT & PLAN NOTE
Check blood pressure and if at 110/60.  Discussed with patient she will monitor for dizziness and lightheadedness advised to continue meds at current dose and get labs to ensure no renal or other issues, electrolyte issues.

## 2023-10-26 NOTE — PATIENT INSTRUCTIONS

## 2023-10-26 NOTE — ASSESSMENT & PLAN NOTE
Patient has not had lipids ordered in a long time we will get labs to ensure stability and advised to continue meds in light of smoking issues

## 2023-10-26 NOTE — PROGRESS NOTES
"Subjective   Reason for Visit: Idania Orozco is an 68 y.o. female here for a Medicare Wellness visit.     Past Medical, Surgical, and Family History reviewed and updated in chart.    Reviewed all medications by prescribing practitioner or clinical pharmacist (such as prescriptions, OTCs, herbal therapies and supplements) and documented in the medical record.     HPI  Patient is also here for follow-up of hypertension.. Symptoms do not include headache confusion visual disturbance dizziness shortness of breath chest pain syncope or palpitations. Recent blood pressure patient has not been checking. By report there is good compliance with treatment. Pertinent medical history includes hyperlipidemia   Presented for Medicare physical she has no new complaints this bronchitis in the wintertime would like antibiotic.  Prednisone prescription for follow-up.  Otherwise feeling well with no chest pain.  Does have history of COPD and status post lung cancer and    follows  up  w follows   oncology    for management  Patient is also here for follow-up of hyperlipidemia.  .... Associated symptoms do not include chest pain, Cramps with exertion, myalgias or nausea. Current treatment includes statins. By report there is good compliance with treatment. Pertinent medical history includes   hypertension ..  Here for management of COPD.  She takes albuterol  Trilogy and  montelukast... she denies coughing up colored  sputu,m... yes dyspnea  Patient Care Team:  Yan Vasquez DO as PCP - General  Yan Vasquez DO as PCP - [facility] Attributed Provider     Review of Systems  cardiovascular:  no  palpitations or chest  pain  respiratory: no  shortness  of  breath  endocrine:  no polydipsia,  no polyuria  musculoskeletal:  no  myalgia..yes  arthralgia  All other  systems discussed  negative   Objective   BP  s:  /60   Pulse 75   Temp 36.1 °C (97 °F)   Resp 18   Ht 1.575 m (5' 2\")   Wt 67.6 kg (149 lb)   SpO2 96%   BMI " 27.25 kg/m²       Physical Exam  general: alert oriented x three  HEENT hearing normal to voice  Neck supple  Lungs respirations non-labored. wheeze  Cardiovascular: no peripheral edema  Skin: warm and dry without rash  Psych: judgement and insight normal  Musculoskeletal:  ambulation normal,  hips with  injection x two  bilateral hips  .  lymph:negative cervical  LYMPADENOPATHY  thyroid: non palpable enlargement   X-ray hip with pelvis right side dated 8/20 showed no obvious fracture some mild calcific changes in the acetabulum with a formation consistent with acetabular impingement.    Assessment/Plan   Problem List Items Addressed This Visit       Arthritis    Relevant Orders    CBC and Auto Differential    COPD (chronic obstructive pulmonary disease) (CMS/[facility])    Current Assessment & Plan     Patient has COPD she remains inhalers specificallyThe Trelegy plus albuterol and Advair we will continue meds Rx given for prednisone and azithromycin         Relevant Medications    azithromycin (Zithromax) 500 mg tablet    predniSONE (Deltasone) 20 mg tablet    Hyperlipidemia    Current Assessment & Plan     Patient has not had lipids ordered in a long time we will get labs to ensure stability and advised to continue meds in light of smoking issues         Relevant Orders    Vascular US Carotid Artery Duplex Bilateral    Comprehensive Metabolic Panel    HTN (hypertension)    Current Assessment & Plan     Check blood pressure and if at 110/60.  Discussed with patient she will monitor for dizziness and lightheadedness advised to continue meds at current dose and get labs to ensure no renal or other issues, electrolyte issues.         Relevant Orders    Comprehensive Metabolic Panel     Other Visit Diagnoses       Routine general medical examination at health care facility    -  Primary    Relevant Orders    Hepatitis C Antibody    Comprehensive Metabolic Panel    Diabetes mellitus screening        Relevant Orders    POCT  glycosylated hemoglobin (Hb A1C) manually resulted    Screening for cardiovascular condition        Relevant Orders    Lipid panel    CT cardiac scoring wo IV contrast    Need for vaccination        Asymptomatic menopausal state        Relevant Orders    XR DEXA bone density    Encounter for screening mammogram for breast cancer        Relevant Orders    BI mammo bilateral screening tomosynthesis    Dizziness and giddiness        Relevant Orders    Vascular US Carotid Artery Duplex Bilateral    Iron deficiency anemia, unspecified iron deficiency anemia type        Relevant Orders    CBC and Auto Differential        MEDICARE SUMMARY  Cervical cancer: Screening --women aged 21 to  65..     /. It is no longer advisable in light of your history of normal Paps  HIV infection: Screening:  Those adults at risk H 15-65 years  Not indicated   High blood pressure: Screening  and home monitoring... Adults with history and at risk  Monitor your blood pressure at home and notify if blood pressure consistently over 140 systolic 90 diastolic   BRCA 1/2- related cancer, screening, and counseling--- women with a personal or family history of breast, ovarian, tubal, or peritoneal cancer or and he has history associated with BRCA 1/2 gene mutation  Breast cancer: Preventative medications--women at increased risk for breast cancer done 4/23  Breast cancer: Screening with   mammography..--Women aged 50-74 years  Diabetes mellitus  today  hgba1c  5.0 (type II (and abnormal blood glucose: Screening--adults H 40-70 years who are overweight or obese  Falls prevention in community swelling older adults:.. Interventions--- adults 65 or older   discussed   Healthful diet and physical activity for C VD disease prevention: Counseling--adults with CVD risk factors  Hepatitis C virus infection: Screening--adults at high risk and adults born between 1945 and 1965   ordered  in  light  of history  of blood trasfusion  Lung cancer: Screening --adults  ages 55-80 with a 30 pack year smoking history and currently smoke or have quit within the past 15 years  ordered     med/hematology oncology  ct 12/22  Osteoporosis to prevent fractures: Screening close post menopausal women  younger than 65 years at increased risk of osteoporosis  Osteoporosis to prevent fractures: Screening women 65 and older  ordered  Statin use for the primary prevention of CVD: Preventative medicine--adults  40-75 years with no history of CVD, one or more CVD risk factors, and a calculated 10 year CVD event risk of 10% or greater  on med  Immunization  updates... flu   .. High  dose given

## 2023-10-26 NOTE — ASSESSMENT & PLAN NOTE
Patient has COPD she remains inhalers specificallyThe Trelegy plus albuterol and Advair we will continue meds Rx given for prednisone and azithromycin

## 2023-11-09 ENCOUNTER — HOSPITAL ENCOUNTER (OUTPATIENT)
Dept: RADIOLOGY | Facility: HOSPITAL | Age: 68
Discharge: HOME | End: 2023-11-09
Payer: MEDICARE

## 2023-11-09 DIAGNOSIS — Z13.6 SCREENING FOR CARDIOVASCULAR CONDITION: ICD-10-CM

## 2023-11-09 PROCEDURE — 75571 CT HRT W/O DYE W/CA TEST: CPT

## 2024-01-02 DIAGNOSIS — H60.399 ACUTE BACTERIAL OTITIS EXTERNA: ICD-10-CM

## 2024-01-02 DIAGNOSIS — J44.9 CHRONIC OBSTRUCTIVE PULMONARY DISEASE, UNSPECIFIED COPD TYPE (MULTI): ICD-10-CM

## 2024-01-02 RX ORDER — ACETIC ACID 20.65 MG/ML
SOLUTION AURICULAR (OTIC)
Qty: 45 ML | Refills: 0 | Status: SHIPPED | OUTPATIENT
Start: 2024-01-02

## 2024-01-02 RX ORDER — PREDNISONE 20 MG/1
20 TABLET ORAL 2 TIMES DAILY
Qty: 180 TABLET | Refills: 0 | Status: SHIPPED | OUTPATIENT
Start: 2024-01-02

## 2024-01-02 RX ORDER — AZITHROMYCIN 500 MG/1
TABLET, FILM COATED ORAL
Qty: 90 TABLET | Refills: 0 | Status: SHIPPED | OUTPATIENT
Start: 2024-01-02

## 2024-01-03 ENCOUNTER — TELEPHONE (OUTPATIENT)
Dept: PRIMARY CARE | Facility: CLINIC | Age: 69
End: 2024-01-03
Payer: MEDICARE

## 2024-01-03 NOTE — TELEPHONE ENCOUNTER
Angie with [facility] calling to verify your rx of Prednisone and Zithromax are supposed to be 90 day supply before she fills ?  Please Advise

## 2024-03-05 ENCOUNTER — APPOINTMENT (OUTPATIENT)
Dept: RADIOLOGY | Facility: HOSPITAL | Age: 69
End: 2024-03-05
Payer: MEDICARE

## 2024-03-06 ENCOUNTER — HOSPITAL ENCOUNTER (OUTPATIENT)
Dept: RADIOLOGY | Facility: HOSPITAL | Age: 69
Discharge: HOME | End: 2024-03-06
Payer: MEDICARE

## 2024-03-06 DIAGNOSIS — C34.11 MALIGNANT NEOPLASM OF UPPER LOBE, RIGHT BRONCHUS OR LUNG (MULTI): ICD-10-CM

## 2024-03-06 PROCEDURE — 71250 CT THORAX DX C-: CPT

## 2024-03-06 PROCEDURE — 71250 CT THORAX DX C-: CPT | Performed by: STUDENT IN AN ORGANIZED HEALTH CARE EDUCATION/TRAINING PROGRAM

## 2024-05-16 ENCOUNTER — APPOINTMENT (OUTPATIENT)
Dept: ORTHOPEDIC SURGERY | Facility: CLINIC | Age: 69
End: 2024-05-16
Payer: MEDICARE

## 2024-05-30 ENCOUNTER — OFFICE VISIT (OUTPATIENT)
Dept: ORTHOPEDIC SURGERY | Facility: CLINIC | Age: 69
End: 2024-05-30
Payer: MEDICARE

## 2024-05-30 ENCOUNTER — HOSPITAL ENCOUNTER (OUTPATIENT)
Dept: RADIOLOGY | Facility: CLINIC | Age: 69
Discharge: HOME | End: 2024-05-30
Payer: MEDICARE

## 2024-05-30 DIAGNOSIS — M70.61 GREATER TROCHANTERIC BURSITIS OF RIGHT HIP: ICD-10-CM

## 2024-05-30 DIAGNOSIS — M25.551 RIGHT HIP PAIN: ICD-10-CM

## 2024-05-30 DIAGNOSIS — M16.11 PRIMARY OSTEOARTHRITIS OF RIGHT HIP: Primary | ICD-10-CM

## 2024-05-30 PROCEDURE — 99203 OFFICE O/P NEW LOW 30 MIN: CPT | Performed by: ORTHOPAEDIC SURGERY

## 2024-05-30 PROCEDURE — 73502 X-RAY EXAM HIP UNI 2-3 VIEWS: CPT | Mod: RT

## 2024-05-30 PROCEDURE — 20610 DRAIN/INJ JOINT/BURSA W/O US: CPT | Mod: RT | Performed by: ORTHOPAEDIC SURGERY

## 2024-05-30 PROCEDURE — 73502 X-RAY EXAM HIP UNI 2-3 VIEWS: CPT | Mod: RIGHT SIDE | Performed by: ORTHOPAEDIC SURGERY

## 2024-05-30 PROCEDURE — 2500000004 HC RX 250 GENERAL PHARMACY W/ HCPCS (ALT 636 FOR OP/ED): Performed by: ORTHOPAEDIC SURGERY

## 2024-05-30 PROCEDURE — 2500000005 HC RX 250 GENERAL PHARMACY W/O HCPCS: Performed by: ORTHOPAEDIC SURGERY

## 2024-05-30 PROCEDURE — 99213 OFFICE O/P EST LOW 20 MIN: CPT | Performed by: ORTHOPAEDIC SURGERY

## 2024-05-30 RX ORDER — BETAMETHASONE SODIUM PHOSPHATE AND BETAMETHASONE ACETATE 3; 3 MG/ML; MG/ML
2 INJECTION, SUSPENSION INTRA-ARTICULAR; INTRALESIONAL; INTRAMUSCULAR; SOFT TISSUE
Status: COMPLETED | OUTPATIENT
Start: 2024-05-30 | End: 2024-05-30

## 2024-05-30 RX ORDER — LIDOCAINE HYDROCHLORIDE 10 MG/ML
5 INJECTION INFILTRATION; PERINEURAL
Status: COMPLETED | OUTPATIENT
Start: 2024-05-30 | End: 2024-05-30

## 2024-05-30 RX ADMIN — LIDOCAINE HYDROCHLORIDE 5 ML: 10 INJECTION, SOLUTION INFILTRATION; PERINEURAL at 14:01

## 2024-05-30 RX ADMIN — BETAMETHASONE SODIUM PHOSPHATE AND BETAMETHASONE ACETATE 2 ML: 3; 3 INJECTION, SUSPENSION INTRA-ARTICULAR; INTRALESIONAL; INTRAMUSCULAR at 14:01

## 2024-05-30 NOTE — PROGRESS NOTES
History of present: Combination of right hip arthritis and trochanteric bursitis    She saw us over 3 years ago with a bursa shot did quite well she comes in for evaluation today with increasing pain a little bit more in the groin but particularly more painful over the side of the right hip        Past medical history:    The patient's past medical history, family history, social history and review of systems were documented on the patient's medical intake form.  The medical intake form was reviewed and scanned into the electronic medical record for future use.  History is otherwise negative except as stated in the history of present illness.    Physical exam:    General: Alert and oriented to person place and time.  No acute distress and breathing comfortably, pleasant and cooperative with examination.    Head and neck exam: Head is normocephalic atraumatic.    Neck: Supple no visible swelling or deformity.    Cardiovascular: Good perfusion to affected extremities without signs or symptoms of chest pain.    Lungs no audible wheezing or labored breathing on examination.    Abdominal exam: Nondistended nontender    Extremities: The affected left hip was examined and inspected.  There was tenderness to touch along the groin and over the lateral aspect of the hip over the bursal area.  Hip joint moves freely.    There was no pain over the groin area to internal/external rotation abduction.    Palpable reproducible pain was reproduced with palpation over the lateral trochanteric process.  There was a tight IT band with a positive Liam sign.      The skin was intact without breakdown or open wound.  Old incisions of present were all healed.  There was mild crepitus seen with internal and external rotation and range of motion without evidence of gross instability.    Inspection of the low back showed normal curvature integrity of the skin.  The strength and stability of the low back and ligaments were within normal  limits.    There was a normal straight leg raise with no foot drop, numbness or tingling in the bilateral lower extremities.  Sensation, reflexes and pulses in the foot and ankle are preserved and present.  There was no obvious effusion.    Range of motion showed flexion to beyond 100 degrees degrees, abduction to 30 degrees, external rotation to 15 degrees and 18 degrees of internal rotation.  The patient had the ability to bear weight but with discomfort.  The patient's gait antalgic secondary to discomfort      Before aspiration injection the benefits of a cortisone injection including infection, local skin irritation, skin atrophy, calcification, continued pain and discomfort, elevated blood sugar, burning, failure to relieve pain, possible late infection were discussed with the patient.    Postprocedure discomfort can be alleviated with additional medications, ice, elevation, rest over the first 24 hours as recommended.    Patient verbalized understanding and wanted to proceed with the planned procedure.    After informed consent was provided and allergies verified, the patient was positioned appropriately on the  bed.  The right hip to be aspirated and injected was prepped and draped in a sterile fashion.  The skin was anesthetized with ethyl chloride spray.  A joint aspiration was to be performed an 18-gauge needle was used otherwise a 22-gauge needle was used to inject the appropriate joint.      Joint injection was performed with a mixture of 5 cc 1% lidocaine plain and 2 cc Celestone Soluspan 6 mg per mL.  The needle was removed and the puncture site closed and sealed with a Band-Aid.  The patient tolerated the procedure well.        Diagnostic studies: X-rays today do show some advancing arthritic change she had a chronically large pincer spur but now some cystic change and a little bit more arthritis in the femoral head right side      Impression: Right hip combination of arthritis and cystic change in  the femoral acetabular head but consistent with right hip bursitis      Plan: Bursa injection therapy stretching program follow-up in a few weeks if her pain does not dissipate MRI of the right hip would be in order to evaluate the cystic change in the femoral head she also may be a candidate for possible fluoroscopic guided injection failing these measures we would talk about hip arthroplasty        L Inj/Asp: R greater trochanteric bursa on 5/30/2024 2:01 PM  Indications: pain and diagnostic evaluation  Details: 25 G needle, posterior approach  Medications: 5 mL lidocaine 10 mg/mL (1 %); 2 mL betamethasone acet,sod phos 6 mg/mL  Outcome: tolerated well, no immediate complications  Procedure, treatment alternatives, risks and benefits explained, specific risks discussed. Consent was given by the patient. Immediately prior to procedure a time out was called to verify the correct patient, procedure, equipment, support staff and site/side marked as required. Patient was prepped and draped in the usual sterile fashion.

## 2024-06-10 DIAGNOSIS — M54.16 LUMBAR RADICULOPATHY, RIGHT: ICD-10-CM

## 2024-06-10 RX ORDER — DULOXETIN HYDROCHLORIDE 60 MG/1
60 CAPSULE, DELAYED RELEASE ORAL DAILY
Qty: 90 CAPSULE | Refills: 1 | Status: SHIPPED | OUTPATIENT
Start: 2024-06-10 | End: 2025-06-10

## 2024-06-10 NOTE — TELEPHONE ENCOUNTER
Pt knows she needs appointment, however is going out of town and will be out of med.   She states she will schedule upon her return   Please Advise

## 2024-06-10 NOTE — TELEPHONE ENCOUNTER
Pt called office requesting refill attached to this message  Please advise, pt is scheduled for appt here in office when she gets back from vacation please advise, thanks!

## 2024-07-23 ENCOUNTER — APPOINTMENT (OUTPATIENT)
Dept: PRIMARY CARE | Facility: CLINIC | Age: 69
End: 2024-07-23
Payer: MEDICARE

## 2024-07-23 ENCOUNTER — TELEPHONE (OUTPATIENT)
Dept: PRIMARY CARE | Facility: CLINIC | Age: 69
End: 2024-07-23

## 2024-07-23 VITALS
RESPIRATION RATE: 18 BRPM | WEIGHT: 150 LBS | BODY MASS INDEX: 27.6 KG/M2 | SYSTOLIC BLOOD PRESSURE: 126 MMHG | DIASTOLIC BLOOD PRESSURE: 83 MMHG | OXYGEN SATURATION: 98 % | TEMPERATURE: 97 F | HEIGHT: 62 IN | HEART RATE: 78 BPM

## 2024-07-23 DIAGNOSIS — M81.0 OSTEOPOROSIS, UNSPECIFIED OSTEOPOROSIS TYPE, UNSPECIFIED PATHOLOGICAL FRACTURE PRESENCE: ICD-10-CM

## 2024-07-23 DIAGNOSIS — I10 PRIMARY HYPERTENSION: ICD-10-CM

## 2024-07-23 DIAGNOSIS — K63.5 POLYP OF COLON, UNSPECIFIED PART OF COLON, UNSPECIFIED TYPE: ICD-10-CM

## 2024-07-23 DIAGNOSIS — J44.9 CHRONIC OBSTRUCTIVE PULMONARY DISEASE, UNSPECIFIED COPD TYPE (MULTI): ICD-10-CM

## 2024-07-23 DIAGNOSIS — N18.31 STAGE 3A CHRONIC KIDNEY DISEASE (MULTI): ICD-10-CM

## 2024-07-23 DIAGNOSIS — Z12.31 BREAST CANCER SCREENING BY MAMMOGRAM: ICD-10-CM

## 2024-07-23 DIAGNOSIS — J30.9 ALLERGIC RHINITIS, UNSPECIFIED SEASONALITY, UNSPECIFIED TRIGGER: ICD-10-CM

## 2024-07-23 DIAGNOSIS — D50.8 OTHER IRON DEFICIENCY ANEMIA: ICD-10-CM

## 2024-07-23 DIAGNOSIS — C78.02 SECONDARY MALIGNANT NEOPLASM OF LEFT LUNG (MULTI): ICD-10-CM

## 2024-07-23 DIAGNOSIS — G37.9 DEMYELINATING DISEASE OF CENTRAL NERVOUS SYSTEM, UNSPECIFIED (MULTI): ICD-10-CM

## 2024-07-23 DIAGNOSIS — F33.1 MAJOR DEPRESSIVE DISORDER, RECURRENT, MODERATE (MULTI): Primary | ICD-10-CM

## 2024-07-23 DIAGNOSIS — Z12.11 SCREENING FOR COLON CANCER: ICD-10-CM

## 2024-07-23 DIAGNOSIS — D64.9 ANEMIA, UNSPECIFIED TYPE: ICD-10-CM

## 2024-07-23 DIAGNOSIS — E78.5 HYPERLIPIDEMIA, UNSPECIFIED HYPERLIPIDEMIA TYPE: ICD-10-CM

## 2024-07-23 DIAGNOSIS — M54.16 LUMBAR RADICULOPATHY, RIGHT: ICD-10-CM

## 2024-07-23 PROBLEM — E66.01 MORBID OBESITY (MULTI): Status: RESOLVED | Noted: 2017-03-06 | Resolved: 2024-07-23

## 2024-07-23 PROBLEM — K52.9 COLITIS: Status: ACTIVE | Noted: 2024-07-23

## 2024-07-23 PROBLEM — Z86.79 HISTORY OF HYPERTENSION: Status: ACTIVE | Noted: 2024-07-23

## 2024-07-23 PROBLEM — D50.9 IRON DEFICIENCY ANEMIA: Status: ACTIVE | Noted: 2024-07-23

## 2024-07-23 PROBLEM — Z86.010 HISTORY OF COLONIC POLYPS: Status: ACTIVE | Noted: 2024-07-23

## 2024-07-23 PROBLEM — Z86.0100 HISTORY OF COLONIC POLYPS: Status: ACTIVE | Noted: 2024-07-23

## 2024-07-23 PROBLEM — Z86.39 HISTORY OF ELEVATED LIPIDS: Status: ACTIVE | Noted: 2024-07-23

## 2024-07-23 PROCEDURE — 3074F SYST BP LT 130 MM HG: CPT | Performed by: FAMILY MEDICINE

## 2024-07-23 PROCEDURE — 99214 OFFICE O/P EST MOD 30 MIN: CPT | Performed by: FAMILY MEDICINE

## 2024-07-23 PROCEDURE — 3079F DIAST BP 80-89 MM HG: CPT | Performed by: FAMILY MEDICINE

## 2024-07-23 PROCEDURE — 3008F BODY MASS INDEX DOCD: CPT | Performed by: FAMILY MEDICINE

## 2024-07-23 PROCEDURE — 1159F MED LIST DOCD IN RCRD: CPT | Performed by: FAMILY MEDICINE

## 2024-07-23 RX ORDER — FLUTICASONE FUROATE, UMECLIDINIUM BROMIDE AND VILANTEROL TRIFENATATE 200; 62.5; 25 UG/1; UG/1; UG/1
1 POWDER RESPIRATORY (INHALATION) DAILY
Qty: 90 EACH | Refills: 3 | Status: SHIPPED | OUTPATIENT
Start: 2024-07-23 | End: 2025-07-23

## 2024-07-23 RX ORDER — DULOXETIN HYDROCHLORIDE 60 MG/1
60 CAPSULE, DELAYED RELEASE ORAL DAILY
Qty: 90 CAPSULE | Refills: 3 | Status: SHIPPED | OUTPATIENT
Start: 2024-07-23 | End: 2025-07-23

## 2024-07-23 RX ORDER — CETIRIZINE HYDROCHLORIDE 10 MG/1
10 TABLET ORAL DAILY
Qty: 90 TABLET | Refills: 3 | Status: SHIPPED | OUTPATIENT
Start: 2024-07-23

## 2024-07-23 RX ORDER — SIMVASTATIN 40 MG/1
40 TABLET, FILM COATED ORAL NIGHTLY
Qty: 90 TABLET | Refills: 3 | Status: SHIPPED | OUTPATIENT
Start: 2024-07-23

## 2024-07-23 RX ORDER — ALENDRONATE SODIUM 70 MG/1
70 TABLET ORAL
Qty: 4 TABLET | Refills: 11 | Status: SHIPPED | OUTPATIENT
Start: 2024-07-23 | End: 2025-07-23

## 2024-07-23 RX ORDER — LISINOPRIL AND HYDROCHLOROTHIAZIDE 12.5; 2 MG/1; MG/1
1 TABLET ORAL DAILY
Qty: 90 TABLET | Refills: 3 | Status: SHIPPED | OUTPATIENT
Start: 2024-07-23

## 2024-07-23 RX ORDER — ALBUTEROL SULFATE 90 UG/1
1 AEROSOL, METERED RESPIRATORY (INHALATION) EVERY 4 HOURS PRN
Qty: 18 G | Refills: 3 | Status: SHIPPED | OUTPATIENT
Start: 2024-07-23

## 2024-07-23 RX ORDER — PREDNISONE 20 MG/1
20 TABLET ORAL 2 TIMES DAILY
Qty: 10 TABLET | Refills: 3 | Status: SHIPPED | OUTPATIENT
Start: 2024-07-23

## 2024-07-23 ASSESSMENT — ENCOUNTER SYMPTOMS
FATIGUE: 0
BACK PAIN: 1
ARTHRALGIAS: 1
SPEECH DIFFICULTY: 0
LIGHT-HEADEDNESS: 0
DIZZINESS: 0
COUGH: 0
DIARRHEA: 0
BLOOD IN STOOL: 0
CONSTIPATION: 0
SEIZURES: 0

## 2024-07-23 NOTE — PATIENT INSTRUCTIONS
maciel List Items Addressed This Visit         Allergic rhinit     Relevant Medications     cetirizine (ZyrTEC) 10 mg tablet     Colon polyp       In light of anemia perhaps both upper and lower endoscopy would be appropriate referral given and discussed with patient           COPD (chronic obstructive pulmonary disease) (Multi)     Relevant Medications     predniSONE (Deltasone) 20 mg tablet     fluticasone-umeclidin-vilanter (Trelegy Ellipta) 200-62.5-25 mcg blister with device     albuterol 90 mcg/actuation inhaler     Hyperlipidemia     Relevant Medications     simvastatin (Zocor) 40 mg tablet     Other Relevant Orders     Lipid Panel     Creatine Kinase     Lumbar radiculopathy, right     Relevant Medications     DULoxetine (Cymbalta) 60 mg DR capsule     Secondary malignant neoplasm of left lung (Multi)     HTN (hypertension)     Relevant Medications     lisinopriL-hydrochlorothiazide 20-12.5 mg tablet     Demyelinating disease of central nervous system, unspecified (Multi)     Major depressive disorder, recurrent, moderate (Multi) - Primary     Stage 3a chronic kidney disease (Multi)       GFR dated 7/24 was 42 with previous GFR dated 1/21 at 47 with GFR discussed with patient kidney function with normal function overs 60 and recent deterioration in kidney function and explained causation... consider nephrology referral            Relevant Orders     Referral to Nephrology     Iron deficiency anemia       Continue  with  planned  iromn transfusion  .. Per hematology             Other Visit Diagnoses         Screening for colon cancer         Relevant Orders     Colonoscopy Screening; High Risk Patient     Breast cancer screening by mammogram         Relevant Orders     BI mammo bilateral screening tomosynthesis     Anemia, unspecified type         Relevant Orders     Esophagogastroduodenoscopy (EGD)     Osteoporosis, unspecified osteoporosis type, unspecified pathological fracture presence         Relevant  Medications     alendronate (Fosamax) 70 mg tablet     Other Relevant Orders     XR DEXA bone density    Please consider exercise program involving walking or some other form of aerobic activity 5 days weekly for 30 minutes... Let's also consider strengthening of large muscle groups like the abdominal muscles or shoulder muscles... Twice weekly with reps of 5/10/15 exercises and gradually increase strength.. This is not heavy strength training but light weight training... Sit ups or back exercise routine.. Please ask for handout if uncertain how to do..This  will help to strengthen your muscles which in turn will help you to lose weight.... You might ask what is the best diet available.. I would strongly encourage you to consider  Weight Watchers.. And as  your  fellow on  Weight Watchers physician attempting to  live this  LIFE  style  choice with you....  I will be glad to give you recommendations on what to eat.. Consider buying Allison bread.., jayden bagle thin bread.. oikos yogurt... eggs  to eat as hard-boiled... Halo top ice cream for snack... All these are delicious foods which.. when eaten and  being compliant eating three  meals daily  breakfast lunch and dinner, drinking  64 ounces of water daily we will all win together !!!!!!!. This will be a means for you to lose weight... Consider also the smart phone raffaele ... My plate.. Or My  fitness  pal..,  as additional possibilities for weight loss... Arron Vasquez!    Discussed medication side effects.  The  risk benefits and treatment options  discussed with patient.       Please schedule follow-up appointment based upon your improvement/failure to improve/chronic medical conditions and physician recommendations during office appointment at the .       Patient advised to go to er if symptoms worsen or to call answering service, or to return to office for additional evaluation    This note was partially  generated using Dragon voice  recognition and there may be incorrect words, wording, spelling, or pronunciation errors that were not corrected prior to committing the note to the medical record.     You can continue on the additional vitamin D supplement of 5000 international units until the end of May of this calendar year .  Starting June.. Please continue with the vitamin D with your multivitamin and calcium supplement, this will be from June to October of ths calendar year    So you can take the 5000 international units of vitamin D from November to March of every year.  The other months you will get vitamin D is you have before with a multivitamin and calcium supplement.  Please take your vitamin D with a handful of water unsalted almonds while with the meal.  Vitamin D is a fat-soluble vitamin that requires fat in the food to be well absorbed.  We recommend healthy fats such as with nuts, seeds, avocados, olives or with a healthy meal.  Also you may notice a multivitamin has some vitamin D.  Spending up to 30 minutes in the sun during the summer in late spring can provide natural vitamin D to the uncovered skin (arm, legs).    Calcium citrate 600 mg once daily and drink 1 cup of plant-based milk twice daily.  The plan based milk can be almond milk, soy milk, etc.    continue  diet with healthy calcium much foods such as:  Healthy foods that are rich in calcium include: Nuts, seeds, legumes/beans, peas, dark green leafy vegetables, plant-based milk  Additional calcium rich foods listed below  -Soaking almonds  overnight in the fridge with drinking water, can help with softening the almonds and improved absorption of the almonds.  If your lab work shows insufficient calcium or you are unable to consume the foods rich in calcium  ...... some supplement is recommended, such as calcium citrate.    Please continue following with your dentist every 6 months  If you are on an osteoporosis medication, please inform a dentist that invasive dental  work with these medications can increase once risk for osteonecrosis of the jaw.  Please continue with good oral hygiene and dental care.    Review of osteoporosis medications  Medications to prevent bone loss and osteoporosis fractures:  -Oral biphosphonate's (such as Actonel, Boniva, Fosamax/alendronate (these are taken either once a week or once a month depending on med dose chosen, first thing in the morning with special instructions to follow.    The duration should be limited to 5 years, to prevent increased risk for atypical fracture of femur.

## 2024-07-23 NOTE — ASSESSMENT & PLAN NOTE
Continue  with  planned  iromn transfusion  .. Per hematology   
GFR dated 7/24 was 42 with previous GFR dated 1/21 at 47 with GFR discussed with patient kidney function with normal function overs 60 and recent deterioration in kidney function and explained causation... consider nephrology referral   
In light of anemia perhaps both upper and lower endoscopy would be appropriate referral given and discussed with patient  
no

## 2024-08-05 DIAGNOSIS — M81.0 OSTEOPOROSIS, UNSPECIFIED OSTEOPOROSIS TYPE, UNSPECIFIED PATHOLOGICAL FRACTURE PRESENCE: ICD-10-CM

## 2024-08-05 RX ORDER — ALENDRONATE SODIUM 70 MG/1
70 TABLET ORAL
Qty: 4 TABLET | Refills: 11 | Status: SHIPPED | OUTPATIENT
Start: 2024-08-05 | End: 2025-08-05

## 2024-09-03 ENCOUNTER — APPOINTMENT (OUTPATIENT)
Dept: NEPHROLOGY | Facility: CLINIC | Age: 69
End: 2024-09-03
Payer: MEDICARE

## 2024-09-03 VITALS
WEIGHT: 151.4 LBS | HEART RATE: 88 BPM | SYSTOLIC BLOOD PRESSURE: 120 MMHG | BODY MASS INDEX: 27.86 KG/M2 | HEIGHT: 62 IN | DIASTOLIC BLOOD PRESSURE: 74 MMHG

## 2024-09-03 DIAGNOSIS — I10 ESSENTIAL HYPERTENSION: Primary | ICD-10-CM

## 2024-09-03 DIAGNOSIS — N18.31 STAGE 3A CHRONIC KIDNEY DISEASE (MULTI): ICD-10-CM

## 2024-09-03 PROCEDURE — 3074F SYST BP LT 130 MM HG: CPT | Performed by: INTERNAL MEDICINE

## 2024-09-03 PROCEDURE — 1159F MED LIST DOCD IN RCRD: CPT | Performed by: INTERNAL MEDICINE

## 2024-09-03 PROCEDURE — 3078F DIAST BP <80 MM HG: CPT | Performed by: INTERNAL MEDICINE

## 2024-09-03 PROCEDURE — 3008F BODY MASS INDEX DOCD: CPT | Performed by: INTERNAL MEDICINE

## 2024-09-03 PROCEDURE — 99203 OFFICE O/P NEW LOW 30 MIN: CPT | Performed by: INTERNAL MEDICINE

## 2024-09-03 NOTE — PROGRESS NOTES
Idania Orozco   69 y.o.      Vitals:    09/03/24 1456   Weight: 68.7 kg (151 lb 6.4 oz)      MRN/Room: [id number]/Room/bed info not found      History Of Present Illness  Idania Orozco is a 69 y.o. female presenting with high Cr level.     Past Medical History  She has a past medical history of Anxiety disorder, unspecified, Personal history of colonic polyps, Personal history of other diseases of the circulatory system, Personal history of other diseases of the musculoskeletal system and connective tissue, Personal history of other diseases of the respiratory system, and Personal history of other endocrine, nutritional and metabolic disease.    Surgical History  She has a past surgical history that includes Other surgical history (01/16/2020); Other surgical history (01/16/2020); and Knee surgery (04/30/2018).     Social History  She reports that she has been smoking cigarettes. She has a 20 pack-year smoking history. She has never used smokeless tobacco. She reports that she does not currently use alcohol. She reports that she does not use drugs.    Family History  Family History   Problem Relation Name Age of Onset    Stomach cancer Mother      Heart failure Father      Breast cancer Sister          Allergies  Penicillins    Meds:         Current Outpatient Medications   Medication Sig Dispense Refill    alendronate (Fosamax) 70 mg tablet Take 1 tablet (70 mg) by mouth every 7 days. Take in the morning with a full glass of water, on an empty stomach, and do not take anything else by mouth or lie down for the next 30 min. 4 tablet 11    ALPRAZolam (Xanax) 0.5 mg tablet Take 1 tablet (0.5 mg) by mouth 3 times a day.      aspirin 81 mg EC tablet Take 1 tablet (81 mg) by mouth once daily.      cetirizine (ZyrTEC) 10 mg tablet Take 1 tablet (10 mg) by mouth once daily. 90 tablet 3    DULoxetine (Cymbalta) 60 mg DR capsule Take 1 capsule (60 mg) by mouth once daily. 1 po daily 90 capsule 3     fluticasone-umeclidin-vilanter (Trelegy Ellipta) 200-62.5-25 mcg blister with device 1 puff by continuous inhalation route once daily. Inhale 1 puffs daily 90 each 3    lisinopriL-hydrochlorothiazide 20-12.5 mg tablet Take 1 tablet by mouth once daily. 90 tablet 3    multivitamin tablet Take 1 tablet by mouth once daily.      simvastatin (Zocor) 40 mg tablet Take 1 tablet (40 mg) by mouth once daily at bedtime. 90 tablet 3    topiramate (Topamax) 25 mg tablet Take 1 tablet (25 mg) by mouth 2 times a day.      traZODone (Desyrel) 50 mg tablet Take 1 tablet (50 mg) by mouth once daily at bedtime.      albuterol 2.5 mg /3 mL (0.083 %) nebulizer solution Take 3 mL (2.5 mg) by nebulization every 6 hours if needed for wheezing. 180 mL 3     No current facility-administered medications for this visit.         ROS:  The patient is awake and oriented. No dizziness or lightheadedness. No chills and no fever. No headaches. No nausea and no vomiting. No shortness of breath. No cough. No sputum. No chest pain. No chest tightness. No abdominal pain. No diarrhea and no constipation. No hematemesis or hemoptysis. No hematuria. No rectal bleeding. No melena. No epistaxis. No urinary symptoms. No flank pain. No leg edema. No leg pain. No weakness. No itching. Overall, the rest of the review of systems is also negative.  12 point review of systems otherwise negative as stated in HPI.        Physical Exam:        Vitals:    09/03/24 1456   BP: 120/74   Pulse: 88     General: The patient is awake, oriented, and is not in any distress.  Head and Neck: Normocephalic. No periorbital edema.  Respiratory: Symmetric air entry. Symmetric chest expansion.No respiratory distress.  Cardiovascular: Symmetric peripheral pulses.  Skin: No maculopapular rash.  Musculoskeletal: No peripheral edema in both left and right upper extremities.  No edema in either left or right lower extremities.  Neuro Exam: Speech is fluent. Moves  extremities.        Blood Labs:  No results found for this or any previous visit (from the past 24 hour(s)).   Lab Results   Component Value Date    GLUCOSE 96 05/24/2021    CALCIUM 9.7 05/24/2021     05/24/2021    K 3.6 05/24/2021    CO2 26 05/24/2021     05/24/2021    BUN 30 (H) 05/24/2021    CREATININE 1.00 05/24/2021       Imaging:        Assessment and Plan:  #1 chronic kidney disease stage III.  I do not have any recent blood work in her record and the last available blood work is from May 2021 when creatinine level was 1.0.  She has history of lung cancer back in 2018 for which she received chemotherapy.  I do not have any details of her treatment.  I asked for a basic metabolic panel.  PTH, 25-hydroxy vitamin D, microscopic urinalysis, and spot urine protein to creatinine ratio will be checked.  I also asked for a kidney ultrasound.    2.  Hypertension.  Blood pressure is under control.  Continue the current medications.    I will see her in about 2 to 3 weeks for follow-up.    Pepito Shetty MD  Senior Attending Physician  Director of Onco-Nephrology Program  Division of Nephrology & Hypertension  [facility]

## 2024-09-06 ENCOUNTER — APPOINTMENT (OUTPATIENT)
Dept: RADIOLOGY | Facility: HOSPITAL | Age: 69
End: 2024-09-06
Payer: MEDICARE

## 2024-09-10 ENCOUNTER — HOSPITAL ENCOUNTER (OUTPATIENT)
Dept: RADIOLOGY | Facility: HOSPITAL | Age: 69
Discharge: HOME | End: 2024-09-10
Payer: MEDICARE

## 2024-09-10 DIAGNOSIS — I10 ESSENTIAL HYPERTENSION: ICD-10-CM

## 2024-09-10 DIAGNOSIS — N18.31 STAGE 3A CHRONIC KIDNEY DISEASE (MULTI): ICD-10-CM

## 2024-09-10 PROCEDURE — 76770 US EXAM ABDO BACK WALL COMP: CPT | Performed by: RADIOLOGY

## 2024-09-10 PROCEDURE — 76770 US EXAM ABDO BACK WALL COMP: CPT

## 2024-09-16 ENCOUNTER — LAB (OUTPATIENT)
Dept: LAB | Facility: LAB | Age: 69
End: 2024-09-16
Payer: MEDICARE

## 2024-09-16 DIAGNOSIS — I10 ESSENTIAL HYPERTENSION: ICD-10-CM

## 2024-09-16 DIAGNOSIS — N18.31 STAGE 3A CHRONIC KIDNEY DISEASE (MULTI): ICD-10-CM

## 2024-09-16 LAB
25(OH)D3 SERPL-MCNC: 51 NG/ML (ref 30–100)
ANION GAP SERPL CALC-SCNC: 12 MMOL/L (ref 10–20)
BACTERIA #/AREA URNS AUTO: ABNORMAL /HPF
BUN SERPL-MCNC: 39 MG/DL (ref 6–23)
CALCIUM SERPL-MCNC: 9.9 MG/DL (ref 8.6–10.3)
CHLORIDE SERPL-SCNC: 104 MMOL/L (ref 98–107)
CO2 SERPL-SCNC: 26 MMOL/L (ref 21–32)
CREAT SERPL-MCNC: 1.25 MG/DL (ref 0.5–1.05)
CREAT UR-MCNC: 76.2 MG/DL (ref 20–320)
EGFRCR SERPLBLD CKD-EPI 2021: 47 ML/MIN/1.73M*2
GLUCOSE SERPL-MCNC: 88 MG/DL (ref 74–99)
POTASSIUM SERPL-SCNC: 4.4 MMOL/L (ref 3.5–5.3)
PROT UR-ACNC: 10 MG/DL (ref 5–24)
PROT/CREAT UR: 0.13 MG/MG CREAT (ref 0–0.17)
RBC #/AREA URNS AUTO: ABNORMAL /HPF
SODIUM SERPL-SCNC: 138 MMOL/L (ref 136–145)
SQUAMOUS #/AREA URNS AUTO: ABNORMAL /HPF
TRANS CELLS #/AREA UR COMP ASSIST: ABNORMAL /HPF
WBC #/AREA URNS AUTO: ABNORMAL /HPF

## 2024-09-16 PROCEDURE — 36415 COLL VENOUS BLD VENIPUNCTURE: CPT

## 2024-09-16 PROCEDURE — 83970 ASSAY OF PARATHORMONE: CPT

## 2024-09-16 PROCEDURE — 81001 URINALYSIS AUTO W/SCOPE: CPT

## 2024-09-16 PROCEDURE — 84156 ASSAY OF PROTEIN URINE: CPT

## 2024-09-16 PROCEDURE — 82570 ASSAY OF URINE CREATININE: CPT

## 2024-09-16 PROCEDURE — 80048 BASIC METABOLIC PNL TOTAL CA: CPT

## 2024-09-16 PROCEDURE — 82306 VITAMIN D 25 HYDROXY: CPT

## 2024-09-17 LAB — PTH-INTACT SERPL-MCNC: 32.2 PG/ML (ref 18.5–88)

## 2024-09-18 ENCOUNTER — TELEPHONE (OUTPATIENT)
Dept: NEPHROLOGY | Facility: CLINIC | Age: 69
End: 2024-09-18
Payer: MEDICARE

## 2024-09-18 NOTE — TELEPHONE ENCOUNTER
Patient went to get 24 hr urine test done at [facility] but there is not an order for that in her chart. They told her they could do it if you put the order in. She dropped of the urine today.

## 2024-09-23 ENCOUNTER — TELEPHONE (OUTPATIENT)
Dept: NEPHROLOGY | Facility: CLINIC | Age: 69
End: 2024-09-23
Payer: MEDICARE

## 2024-09-23 DIAGNOSIS — N18.31 STAGE 3A CHRONIC KIDNEY DISEASE (MULTI): Primary | ICD-10-CM

## 2024-09-23 RX ORDER — CIPROFLOXACIN 500 MG/1
500 TABLET ORAL 2 TIMES DAILY
Qty: 6 TABLET | Refills: 0 | Status: SHIPPED | OUTPATIENT
Start: 2024-09-23 | End: 2024-09-26

## 2024-09-23 NOTE — TELEPHONE ENCOUNTER
Patient says she has UTI and would like a script called in. Says she had urinalysis done already. [facility] is [facility] in [place]

## 2024-10-01 ENCOUNTER — APPOINTMENT (OUTPATIENT)
Dept: NEPHROLOGY | Facility: CLINIC | Age: 69
End: 2024-10-01
Payer: MEDICARE

## 2024-10-01 VITALS
SYSTOLIC BLOOD PRESSURE: 128 MMHG | HEIGHT: 62 IN | DIASTOLIC BLOOD PRESSURE: 76 MMHG | HEART RATE: 87 BPM | BODY MASS INDEX: 27.9 KG/M2 | WEIGHT: 151.6 LBS

## 2024-10-01 DIAGNOSIS — I10 ESSENTIAL HYPERTENSION: Primary | ICD-10-CM

## 2024-10-01 DIAGNOSIS — N18.31 STAGE 3A CHRONIC KIDNEY DISEASE (MULTI): ICD-10-CM

## 2024-10-01 DIAGNOSIS — N28.1 RENAL CYST: ICD-10-CM

## 2024-10-01 PROCEDURE — 99213 OFFICE O/P EST LOW 20 MIN: CPT | Performed by: INTERNAL MEDICINE

## 2024-10-01 PROCEDURE — 1159F MED LIST DOCD IN RCRD: CPT | Performed by: INTERNAL MEDICINE

## 2024-10-01 PROCEDURE — 3074F SYST BP LT 130 MM HG: CPT | Performed by: INTERNAL MEDICINE

## 2024-10-01 PROCEDURE — 3078F DIAST BP <80 MM HG: CPT | Performed by: INTERNAL MEDICINE

## 2024-10-01 PROCEDURE — 3008F BODY MASS INDEX DOCD: CPT | Performed by: INTERNAL MEDICINE

## 2024-10-01 NOTE — PROGRESS NOTES
Idania M Ernesto   69 y.o.    @@  N/Room: [id number]/Room/bed info not found    Subjective:   The patient is being seen for a routine clinic follow-up of chronic kidney disease. Recently, the disease has been stable. Disease complications:  No hyperkalemia, no hypocalcemia, no hyperphosphatemia, no metabolic acidosis, no coagulopathy, no uremic encephalopathy, no neuropathy and no renal osteodystrophy. The patient is currently asymptomatic. No associated symptoms are reported.       Meds:   Current Outpatient Medications   Medication Sig Dispense Refill    alendronate (Fosamax) 70 mg tablet Take 1 tablet (70 mg) by mouth every 7 days. Take in the morning with a full glass of water, on an empty stomach, and do not take anything else by mouth or lie down for the next 30 min. 4 tablet 11    ALPRAZolam (Xanax) 0.5 mg tablet Take 1 tablet (0.5 mg) by mouth 3 times a day.      aspirin 81 mg EC tablet Take 1 tablet (81 mg) by mouth once daily.      cetirizine (ZyrTEC) 10 mg tablet Take 1 tablet (10 mg) by mouth once daily. 90 tablet 3    DULoxetine (Cymbalta) 60 mg DR capsule Take 1 capsule (60 mg) by mouth once daily. 1 po daily 90 capsule 3    fluticasone-umeclidin-vilanter (Trelegy Ellipta) 200-62.5-25 mcg blister with device 1 puff by continuous inhalation route once daily. Inhale 1 puffs daily 90 each 3    lisinopriL-hydrochlorothiazide 20-12.5 mg tablet Take 1 tablet by mouth once daily. 90 tablet 3    multivitamin tablet Take 1 tablet by mouth once daily.      simvastatin (Zocor) 40 mg tablet Take 1 tablet (40 mg) by mouth once daily at bedtime. 90 tablet 3    topiramate (Topamax) 25 mg tablet Take 1 tablet (25 mg) by mouth 2 times a day.      traZODone (Desyrel) 50 mg tablet Take 1 tablet (50 mg) by mouth once daily at bedtime.      albuterol 2.5 mg /3 mL (0.083 %) nebulizer solution Take 3 mL (2.5 mg) by nebulization every 6 hours if needed for wheezing. 180 mL 3     No current facility-administered medications  for this visit.          ROS:  The patient is awake and oriented. No dizziness or lightheadedness. No chills and no fever. No headaches. No nausea and no vomiting. No shortness of breath. No cough. No sputum. No chest pain. No chest tightness. No abdominal pain. No diarrhea and no constipation. No hematemesis or hemoptysis. No hematuria. No rectal bleeding. No melena. No epistaxis. No urinary symptoms. No flank pain. No leg edema. No leg pain. No weakness. No itching. Overall, the rest of the review of systems is also negative.  12 point review of systems otherwise negative as stated in HPI.        Physical Examination:        Vitals:    10/01/24 1459   BP: 128/76   Pulse: 87     General: The patient is awake, oriented, and is not in any distress.  Head and Neck: Normocephalic. No periorbital edema.  Eyes: non-icteric  Respiratory: Symmetric air entry. Symmetric chest expansion.No respiratory distress.  Skin: No maculopapular rash.  Musculoskeletal: No peripheral edema in both left and right upper extremities.  No edema in either left or right lower extremities.  Neuro Exam: Speech is fluent. Moves extremities.    Imaging:  === 09/10/24 ===    US RENAL COMPLETE    - Impression -  Simple cyst left kidney.    Additional possible complex cyst versus less likely solid lesion in  the left kidney. Consider six-month sonographic follow-up versus  further evaluation with CT or MRI renal protocol.    Signed by: Emilee Woo 9/11/2024 10:24 PM  Dictation workstation:   [id number]       Blood Labs:  No results found for this or any previous visit (from the past 24 hour(s)).   Lab Results   Component Value Date    PTH 32.2 09/16/2024      Lab Results   Component Value Date    GLUCOSE 88 09/16/2024    CALCIUM 9.9 09/16/2024     09/16/2024    K 4.4 09/16/2024    CO2 26 09/16/2024     09/16/2024    BUN 39 (H) 09/16/2024    CREATININE 1.25 (H) 09/16/2024         Assessment and Plan:  #1 chronic kidney disease  stage III.  Last creatinine level is 1.2.  Normal potassium and bicarb level.  No proteinuria.  No microscopic hematuria.  Normal PTH and vitamin D level.    2.  Hypertension.  Blood pressure is under control.  Continue the current medications.    3.  Complex renal cyst.  I ordered a follow-up kidney ultrasound to be done in about 6 months.         I will see her in about 6 months for follow-up.          Pepito Shetty MD  Senior Attending Physician  Director of Onco-Nephrology Program  Division of Nephrology & Hypertension  [facility]

## 2024-10-09 ENCOUNTER — TELEPHONE (OUTPATIENT)
Dept: PRIMARY CARE | Facility: CLINIC | Age: 69
End: 2024-10-09
Payer: MEDICARE

## 2024-10-09 NOTE — TELEPHONE ENCOUNTER
Patient states she UTI-  Burning when urinates and frequent   She would like antibiotics sent to pharmacy.   Patient is requesting cipro for three days      ciprofloxacin (Cipro) 500 mg tablet [[id number]]      Order Details  Dose: 500 mg Route: oral Frequency: 2 times daily   Dispense Quantity: 6 tablet Refills: 0          Sig: Take 1 tablet (500 mg) by mouth 2 times a day for 3 days.     zithromycin (Zithromax) 500 mg tablet [[id number]]  DISCONTINUED    Order Details  Dose, Route, Frequency: As Directed   Dispense Quantity: 90 tablet Refills: 0    Note to Pharmacy: **Patient requests 90 days supply**         Sig: TAKE 1 TABLET(500 MG) BY MOUTH EVERY DAY         [id number] [address] & [address] [address]  Phone: [phone]  Fax: [phone]

## 2024-10-10 ENCOUNTER — TELEMEDICINE (OUTPATIENT)
Dept: PRIMARY CARE | Facility: CLINIC | Age: 69
End: 2024-10-10
Payer: MEDICARE

## 2024-10-10 DIAGNOSIS — N30.00 ACUTE CYSTITIS WITHOUT HEMATURIA: ICD-10-CM

## 2024-10-10 PROBLEM — N39.0 URINARY TRACT INFECTION WITHOUT HEMATURIA: Status: ACTIVE | Noted: 2024-10-10

## 2024-10-10 PROCEDURE — 99213 OFFICE O/P EST LOW 20 MIN: CPT | Performed by: FAMILY MEDICINE

## 2024-10-10 RX ORDER — NITROFURANTOIN 25; 75 MG/1; MG/1
100 CAPSULE ORAL 2 TIMES DAILY
Qty: 10 CAPSULE | Refills: 0 | Status: SHIPPED | OUTPATIENT
Start: 2024-10-10 | End: 2024-10-10

## 2024-10-10 RX ORDER — NITROFURANTOIN 25; 75 MG/1; MG/1
100 CAPSULE ORAL 2 TIMES DAILY
Qty: 14 CAPSULE | Refills: 0 | Status: SHIPPED | OUTPATIENT
Start: 2024-10-10 | End: 2024-10-17

## 2024-10-10 RX ORDER — PHENAZOPYRIDINE HYDROCHLORIDE 200 MG/1
200 TABLET, FILM COATED ORAL 3 TIMES DAILY PRN
Qty: 6 TABLET | Refills: 0 | Status: SHIPPED | OUTPATIENT
Start: 2024-10-10 | End: 2024-10-12

## 2024-10-10 ASSESSMENT — ENCOUNTER SYMPTOMS
HEMATURIA: 0
CHILLS: 0

## 2024-10-10 NOTE — PROGRESS NOTES
Subjective   Patient ID: Idania Orozco is a 69 y.o. female who presents for No chief complaint on file..  This visit was completed via virtual  visit...due to the restrictions of patients schedule. All issues as below were discussed and addressed, but physical examination was limited to visual inspection only and was performed.. IN THIS restricted fashion. iF It was felt that the patient should be evaluated in clinic then  .they were directed there. The patient verbally consented to visit.    UTI   This is a recurrent problem. The problem occurs intermittently. The quality of the pain is described as burning. The pain is at a severity of 3/10. The pain is mild. There has been no fever. Pertinent negatives include no chills or hematuria. She has tried antibiotics for the symptoms. Her past medical history is significant for recurrent UTIs. There is no history of kidney stones.       Review of Systems   Constitutional:  Negative for chills.   Genitourinary:  Negative for hematuria.       Objective   Physical Exam  general: alert oriented x three  HEENT hearing normal to voice  Neck supple  Lungs respirations non-labored.  Cardiovascular: no peripheral edema  Skin: warm and dry without rash  Abdomen ;   SUPRAPUBIC TENDERNESS  Psych: judgement and insight normal     Labs     Contains abnormal data Microscopic Only, Urine  Order: [id number]   Status: Final result    Push fluidsDx: Essential hypertension; Stage 3a ...    2 Result Notes      Component  Ref Range & Units 3 wk ago   WBC, Urine  1-5, NONE /HPF 21-50 Abnormal    RBC, Urine  NONE, 1-2, 3-5 /HPF NONE   Squamous Epithelial Cells, Urine  Reference range not established. /HPF 26-50 (1+)   Transitional Epithelial Cells, Urine  Reference range not established. /HPF 3-5 (1+)   Bacteria, Urine  NONE SEEN /HPF 1+ Abnormal    Resulting Agency [facility]          Assessment/Plan   Problem List Items Addressed This Visit       Urinary tract infection without hematuria      HAS  UTI  complicated and that was treated with Cipro for 3 days but symptoms have returned.  Will go ahead and start on Macrobid and hopefully him eradication of infection.  But did discuss with patient with culture first and order placed to go to the [facility] tomorrow and get done         Relevant Medications    phenazopyridine (Pyridium) 200 mg tablet    nitrofurantoin, macrocrystal-monohydrate, (Macrobid) 100 mg capsule    Other Relevant Orders    Urinalysis with Reflex Microscopic    Urine culture

## 2024-10-10 NOTE — PATIENT INSTRUCTIONS
Push fluids  Take cranberry pills  Start antibiotic after urine culture  If persistent symptoms call back  See handout

## 2024-10-10 NOTE — ASSESSMENT & PLAN NOTE
HAS  UTI  complicated and that was treated with Cipro for 3 days but symptoms have returned.  Will go ahead and start on Macrobid and hopefully him eradication of infection.  But did discuss with patient with culture first and order placed to go to the [facility] tomorrow and get done

## 2024-10-11 ENCOUNTER — LAB (OUTPATIENT)
Dept: LAB | Facility: LAB | Age: 69
End: 2024-10-11
Payer: MEDICARE

## 2024-10-11 DIAGNOSIS — N30.00 ACUTE CYSTITIS WITHOUT HEMATURIA: ICD-10-CM

## 2024-10-11 LAB
APPEARANCE UR: ABNORMAL
BACTERIA #/AREA URNS AUTO: ABNORMAL /HPF
BILIRUB UR STRIP.AUTO-MCNC: NEGATIVE MG/DL
COLOR UR: ABNORMAL
GLUCOSE UR STRIP.AUTO-MCNC: NORMAL MG/DL
KETONES UR STRIP.AUTO-MCNC: NEGATIVE MG/DL
LEUKOCYTE ESTERASE UR QL STRIP.AUTO: ABNORMAL
NITRITE UR QL STRIP.AUTO: NEGATIVE
PH UR STRIP.AUTO: 6.5 [PH]
PROT UR STRIP.AUTO-MCNC: NEGATIVE MG/DL
RBC # UR STRIP.AUTO: NEGATIVE /UL
RBC #/AREA URNS AUTO: ABNORMAL /HPF
SP GR UR STRIP.AUTO: 1.01
SQUAMOUS #/AREA URNS AUTO: ABNORMAL /HPF
TRANS CELLS #/AREA UR COMP ASSIST: ABNORMAL /HPF
UROBILINOGEN UR STRIP.AUTO-MCNC: NORMAL MG/DL
WBC #/AREA URNS AUTO: ABNORMAL /HPF

## 2024-10-11 PROCEDURE — 87086 URINE CULTURE/COLONY COUNT: CPT

## 2024-10-11 PROCEDURE — 81001 URINALYSIS AUTO W/SCOPE: CPT

## 2024-10-13 LAB — BACTERIA UR CULT: NORMAL

## 2024-10-15 ENCOUNTER — TELEPHONE (OUTPATIENT)
Dept: PRIMARY CARE | Facility: CLINIC | Age: 69
End: 2024-10-15
Payer: MEDICARE

## 2024-11-04 ENCOUNTER — TELEPHONE (OUTPATIENT)
Dept: PRIMARY CARE | Facility: CLINIC | Age: 69
End: 2024-11-04
Payer: MEDICARE

## 2024-11-04 NOTE — TELEPHONE ENCOUNTER
Pt said she went to refill her Prednisone and the Pharmacy said you cancelled it.  She had 3 refills and is requesting at this time   (States you give her 2-3 per year)   Please Advise

## 2024-11-05 ENCOUNTER — TELEPHONE (OUTPATIENT)
Dept: PRIMARY CARE | Facility: CLINIC | Age: 69
End: 2024-11-05
Payer: MEDICARE

## 2024-11-06 DIAGNOSIS — J44.9 CHRONIC OBSTRUCTIVE PULMONARY DISEASE, UNSPECIFIED COPD TYPE (MULTI): Primary | ICD-10-CM

## 2024-11-07 RX ORDER — PREDNISONE 20 MG/1
40 TABLET ORAL DAILY
Qty: 10 TABLET | Refills: 1 | Status: SHIPPED | OUTPATIENT
Start: 2024-11-07 | End: 2024-11-12

## 2024-11-12 ENCOUNTER — OFFICE VISIT (OUTPATIENT)
Dept: PRIMARY CARE | Facility: CLINIC | Age: 69
End: 2024-11-12
Payer: MEDICARE

## 2024-11-12 ENCOUNTER — HOSPITAL ENCOUNTER (EMERGENCY)
Facility: HOSPITAL | Age: 69
Discharge: HOME | End: 2024-11-12
Attending: EMERGENCY MEDICINE
Payer: MEDICARE

## 2024-11-12 ENCOUNTER — APPOINTMENT (OUTPATIENT)
Dept: RADIOLOGY | Facility: HOSPITAL | Age: 69
End: 2024-11-12
Payer: MEDICARE

## 2024-11-12 VITALS
TEMPERATURE: 96.8 F | BODY MASS INDEX: 28.52 KG/M2 | SYSTOLIC BLOOD PRESSURE: 143 MMHG | HEART RATE: 89 BPM | RESPIRATION RATE: 14 BRPM | HEIGHT: 62 IN | OXYGEN SATURATION: 99 % | DIASTOLIC BLOOD PRESSURE: 67 MMHG | WEIGHT: 155 LBS

## 2024-11-12 VITALS
WEIGHT: 155 LBS | BODY MASS INDEX: 28.52 KG/M2 | DIASTOLIC BLOOD PRESSURE: 77 MMHG | RESPIRATION RATE: 18 BRPM | SYSTOLIC BLOOD PRESSURE: 118 MMHG | TEMPERATURE: 97 F | OXYGEN SATURATION: 97 % | HEART RATE: 84 BPM | HEIGHT: 62 IN

## 2024-11-12 DIAGNOSIS — N39.490 OVERFLOW INCONTINENCE: ICD-10-CM

## 2024-11-12 DIAGNOSIS — R35.0 URINARY FREQUENCY: Primary | ICD-10-CM

## 2024-11-12 DIAGNOSIS — R91.1 PULMONARY NODULE: ICD-10-CM

## 2024-11-12 DIAGNOSIS — R10.2 SUPRAPUBIC PAIN, ACUTE: Primary | ICD-10-CM

## 2024-11-12 LAB
ALBUMIN SERPL BCP-MCNC: 4.4 G/DL (ref 3.4–5)
ALP SERPL-CCNC: 51 U/L (ref 33–136)
ALT SERPL W P-5'-P-CCNC: 10 U/L (ref 7–45)
ANION GAP SERPL CALC-SCNC: 12 MMOL/L (ref 10–20)
APPEARANCE UR: CLEAR
AST SERPL W P-5'-P-CCNC: 11 U/L (ref 9–39)
BASOPHILS # BLD AUTO: 0.04 X10*3/UL (ref 0–0.1)
BASOPHILS NFR BLD AUTO: 0.7 %
BILIRUB SERPL-MCNC: 0.3 MG/DL (ref 0–1.2)
BILIRUB UR STRIP.AUTO-MCNC: NEGATIVE MG/DL
BUN SERPL-MCNC: 29 MG/DL (ref 6–23)
CALCIUM SERPL-MCNC: 9.4 MG/DL (ref 8.6–10.3)
CHLORIDE SERPL-SCNC: 103 MMOL/L (ref 98–107)
CO2 SERPL-SCNC: 25 MMOL/L (ref 21–32)
COLOR UR: YELLOW
CREAT SERPL-MCNC: 1.06 MG/DL (ref 0.5–1.05)
EGFRCR SERPLBLD CKD-EPI 2021: 57 ML/MIN/1.73M*2
EOSINOPHIL # BLD AUTO: 0.22 X10*3/UL (ref 0–0.7)
EOSINOPHIL NFR BLD AUTO: 4.1 %
ERYTHROCYTE [DISTWIDTH] IN BLOOD BY AUTOMATED COUNT: 12.7 % (ref 11.5–14.5)
GLUCOSE SERPL-MCNC: 86 MG/DL (ref 74–99)
GLUCOSE UR STRIP.AUTO-MCNC: NORMAL MG/DL
HCT VFR BLD AUTO: 31.4 % (ref 36–46)
HGB BLD-MCNC: 10.3 G/DL (ref 12–16)
IMM GRANULOCYTES # BLD AUTO: 0.01 X10*3/UL (ref 0–0.7)
IMM GRANULOCYTES NFR BLD AUTO: 0.2 % (ref 0–0.9)
KETONES UR STRIP.AUTO-MCNC: NEGATIVE MG/DL
LEUKOCYTE ESTERASE UR QL STRIP.AUTO: NEGATIVE
LIPASE SERPL-CCNC: 176 U/L (ref 9–82)
LYMPHOCYTES # BLD AUTO: 1.81 X10*3/UL (ref 1.2–4.8)
LYMPHOCYTES NFR BLD AUTO: 33.5 %
MCH RBC QN AUTO: 32 PG (ref 26–34)
MCHC RBC AUTO-ENTMCNC: 32.8 G/DL (ref 32–36)
MCV RBC AUTO: 98 FL (ref 80–100)
MONOCYTES # BLD AUTO: 0.46 X10*3/UL (ref 0.1–1)
MONOCYTES NFR BLD AUTO: 8.5 %
NEUTROPHILS # BLD AUTO: 2.86 X10*3/UL (ref 1.2–7.7)
NEUTROPHILS NFR BLD AUTO: 53 %
NITRITE UR QL STRIP.AUTO: NEGATIVE
NRBC BLD-RTO: 0 /100 WBCS (ref 0–0)
PH UR STRIP.AUTO: 5.5 [PH]
PLATELET # BLD AUTO: 241 X10*3/UL (ref 150–450)
POC APPEARANCE, URINE: CLEAR
POC BILIRUBIN, URINE: NEGATIVE
POC BLOOD, URINE: NEGATIVE
POC COLOR, URINE: YELLOW
POC GLUCOSE, URINE: NEGATIVE MG/DL
POC KETONES, URINE: NEGATIVE MG/DL
POC LEUKOCYTES, URINE: ABNORMAL
POC NITRITE,URINE: NEGATIVE
POC PH, URINE: 7 PH
POC PROTEIN, URINE: NEGATIVE MG/DL
POC SPECIFIC GRAVITY, URINE: 1.02
POC UROBILINOGEN, URINE: 0.2 EU/DL
POTASSIUM SERPL-SCNC: 3.9 MMOL/L (ref 3.5–5.3)
PROT SERPL-MCNC: 7.2 G/DL (ref 6.4–8.2)
PROT UR STRIP.AUTO-MCNC: NEGATIVE MG/DL
RBC # BLD AUTO: 3.22 X10*6/UL (ref 4–5.2)
RBC # UR STRIP.AUTO: NEGATIVE /UL
SODIUM SERPL-SCNC: 136 MMOL/L (ref 136–145)
SP GR UR STRIP.AUTO: 1.02
UROBILINOGEN UR STRIP.AUTO-MCNC: NORMAL MG/DL
WBC # BLD AUTO: 5.4 X10*3/UL (ref 4.4–11.3)

## 2024-11-12 PROCEDURE — 36415 COLL VENOUS BLD VENIPUNCTURE: CPT | Performed by: EMERGENCY MEDICINE

## 2024-11-12 PROCEDURE — 96374 THER/PROPH/DIAG INJ IV PUSH: CPT

## 2024-11-12 PROCEDURE — 83690 ASSAY OF LIPASE: CPT

## 2024-11-12 PROCEDURE — 87086 URINE CULTURE/COLONY COUNT: CPT

## 2024-11-12 PROCEDURE — 76856 US EXAM PELVIC COMPLETE: CPT

## 2024-11-12 PROCEDURE — 85025 COMPLETE CBC W/AUTO DIFF WBC: CPT | Performed by: EMERGENCY MEDICINE

## 2024-11-12 PROCEDURE — 3078F DIAST BP <80 MM HG: CPT | Performed by: FAMILY MEDICINE

## 2024-11-12 PROCEDURE — 81003 URINALYSIS AUTO W/O SCOPE: CPT | Performed by: EMERGENCY MEDICINE

## 2024-11-12 PROCEDURE — 99213 OFFICE O/P EST LOW 20 MIN: CPT | Performed by: FAMILY MEDICINE

## 2024-11-12 PROCEDURE — 76856 US EXAM PELVIC COMPLETE: CPT | Performed by: STUDENT IN AN ORGANIZED HEALTH CARE EDUCATION/TRAINING PROGRAM

## 2024-11-12 PROCEDURE — 1160F RVW MEDS BY RX/DR IN RCRD: CPT | Performed by: FAMILY MEDICINE

## 2024-11-12 PROCEDURE — 3074F SYST BP LT 130 MM HG: CPT | Performed by: FAMILY MEDICINE

## 2024-11-12 PROCEDURE — 80053 COMPREHEN METABOLIC PANEL: CPT | Performed by: EMERGENCY MEDICINE

## 2024-11-12 PROCEDURE — 1159F MED LIST DOCD IN RCRD: CPT | Performed by: FAMILY MEDICINE

## 2024-11-12 PROCEDURE — 99285 EMERGENCY DEPT VISIT HI MDM: CPT | Mod: 25

## 2024-11-12 PROCEDURE — 2550000001 HC RX 255 CONTRASTS: Performed by: EMERGENCY MEDICINE

## 2024-11-12 PROCEDURE — 2500000004 HC RX 250 GENERAL PHARMACY W/ HCPCS (ALT 636 FOR OP/ED)

## 2024-11-12 PROCEDURE — 74177 CT ABD & PELVIS W/CONTRAST: CPT

## 2024-11-12 PROCEDURE — 81003 URINALYSIS AUTO W/O SCOPE: CPT | Performed by: FAMILY MEDICINE

## 2024-11-12 PROCEDURE — 76830 TRANSVAGINAL US NON-OB: CPT | Performed by: STUDENT IN AN ORGANIZED HEALTH CARE EDUCATION/TRAINING PROGRAM

## 2024-11-12 PROCEDURE — 3008F BODY MASS INDEX DOCD: CPT | Performed by: FAMILY MEDICINE

## 2024-11-12 PROCEDURE — 74177 CT ABD & PELVIS W/CONTRAST: CPT | Performed by: STUDENT IN AN ORGANIZED HEALTH CARE EDUCATION/TRAINING PROGRAM

## 2024-11-12 RX ORDER — MIRABEGRON 25 MG/1
25 TABLET, FILM COATED, EXTENDED RELEASE ORAL DAILY
Qty: 30 TABLET | Refills: 2 | Status: SHIPPED | OUTPATIENT
Start: 2024-11-12 | End: 2024-12-12

## 2024-11-12 RX ORDER — MIRABEGRON 25 MG/1
25 TABLET, FILM COATED, EXTENDED RELEASE ORAL ONCE
Status: DISCONTINUED | OUTPATIENT
Start: 2024-11-12 | End: 2024-11-12

## 2024-11-12 ASSESSMENT — PAIN DESCRIPTION - LOCATION: LOCATION: PELVIS

## 2024-11-12 ASSESSMENT — PAIN - FUNCTIONAL ASSESSMENT: PAIN_FUNCTIONAL_ASSESSMENT: 0-10

## 2024-11-12 ASSESSMENT — LIFESTYLE VARIABLES
EVER HAD A DRINK FIRST THING IN THE MORNING TO STEADY YOUR NERVES TO GET RID OF A HANGOVER: NO
HAVE PEOPLE ANNOYED YOU BY CRITICIZING YOUR DRINKING: NO
EVER FELT BAD OR GUILTY ABOUT YOUR DRINKING: NO
HAVE YOU EVER FELT YOU SHOULD CUT DOWN ON YOUR DRINKING: NO
TOTAL SCORE: 0

## 2024-11-12 ASSESSMENT — PAIN DESCRIPTION - PAIN TYPE: TYPE: ACUTE PAIN

## 2024-11-12 ASSESSMENT — COLUMBIA-SUICIDE SEVERITY RATING SCALE - C-SSRS
2. HAVE YOU ACTUALLY HAD ANY THOUGHTS OF KILLING YOURSELF?: NO
6. HAVE YOU EVER DONE ANYTHING, STARTED TO DO ANYTHING, OR PREPARED TO DO ANYTHING TO END YOUR LIFE?: NO
1. IN THE PAST MONTH, HAVE YOU WISHED YOU WERE DEAD OR WISHED YOU COULD GO TO SLEEP AND NOT WAKE UP?: NO

## 2024-11-12 ASSESSMENT — PAIN SCALES - GENERAL: PAINLEVEL_OUTOF10: 7

## 2024-11-12 NOTE — PATIENT INSTRUCTIONS
Problem List Items Addressed This Visit       Overflow incontinence     TO  DR DEGROOT           Other Visit Diagnoses       Urinary frequency    -  Primary    Relevant Medications    mirabegron (Myrbetriq) 25 mg tablet extended release 24 hr 24 hr tablet    Other Relevant Orders    POCT UA Automated manually resulted (Completed)    Urine Culture    US pelvis transvaginal    Referral to Gynecology

## 2024-11-12 NOTE — PROGRESS NOTES
Subjective   Patient ID: Idania Orozco is a 69 y.o. female who presents for UTI.  UTI   This is a recurrent problem.   Went  to [facility]  and culture came back   negative  Burning    .. And pressure   and   radiation  into         and burning in   thigh and legs and low  bACK PAIN  URINARY INCONTINENCE   NOCTURIA X THREE   9/24 for symptoms of 2 to 3 days of symptoms like recurrent burning and occurring with every urination gradually worsening quality of the pain described as burning without fever not sexually active no history of pyelonephritis no vaginal discharge no fever no chills no blood in the urine.  Examination at urgent care revealed soft abdomen no tenderness no right CVA or left CVA tenderness urine culture was ordered with results revealing negative culture she was started on Macrobid call today told to come in for additional evaluation in light of negative testing there was no bacterial growth    Patient was seen on 11/10  Review of Systems    Objective   Physical Exam  general: alert oriented x three  HEENT hearing normal to voice  Neck supple  Lungs respirations non-labored.  Cardiovascular: no peripheral edema  Skin: warm and dry without rash  Psych: judgement and insight normal  ABDOMEN  ... SUPRAPUBIC TENDERNESS   Musculoskeletal:  ambulation normal,    NEGATIVE  FABERE  SIGN X TWO   lymph:negative cervical  LYMPADENOPATHY  thyroid: non palpable enlargement   Labs      Yan Vasquez, DO  10/14/2024  6:59 PM EDT       Make sure she is feeling improved with no urinary symptoms since culture was negative and hopefully she has had resolution  Of symptoms   Reviewed x-ray of right hip with well-positioned joint with no advanced osteoarthritic changes dated 5/30/2024  Review of CT of chest and abdomen with findings indicating he orders with primaryUrinary bladder with extensive artifact in the pelvis noted on CT of the abdomen and pelvis with IV contrast 20 with aortic and iliac arthrosclerotic  calcifications without aneurysm or major  Assessment/Plan   Problem List Items Addressed This Visit       Overflow incontinence     TO  DR DEGROOT           Other Visit Diagnoses       Urinary frequency    -  Primary    Relevant Medications    mirabegron (Myrbetriq) 25 mg tablet extended release 24 hr 24 hr tablet    Other Relevant Orders    POCT UA Automated manually resulted (Completed)    Urine Culture    US pelvis transvaginal    Referral to Gynecology

## 2024-11-13 ENCOUNTER — TELEPHONE (OUTPATIENT)
Dept: HEMATOLOGY/ONCOLOGY | Facility: HOSPITAL | Age: 69
End: 2024-11-13
Payer: MEDICARE

## 2024-11-13 LAB
BACTERIA UR CULT: NORMAL
HOLD SPECIMEN: NORMAL

## 2024-11-13 NOTE — TELEPHONE ENCOUNTER
11/13/2024 @ 1313:   Called patient, per Delaney Capps CNP's request, to review referral placed to our [facility] and to discuss next steps/recommendations.  Voicemail message left, on patient's private voicemail, requesting call back to discuss the recent referral to our [facility]; our contact information was provided.    Update sent to TERESE Pate.    Ema Cain RN  [facility] - RN Care Coordinator  Office:  [phone]

## 2024-11-13 NOTE — TELEPHONE ENCOUNTER
11/13/2024 @ 1328:  Patient returned call; introduced myself and explained that I was calling to follow up on the referral placed to our [facility] for her, following her ED evaluation yesterday.  Patient states awareness of the [facility] referral, for follow up on her lung nodules, but states that she is planning to follow up with her current medical oncologist, Dr. Benedict Francis ([facility]).  States she was resting and just woke up, but is planning to call Dr. Francis's office, this afternoon, to schedule a follow-up visit re: her radiology results from yesterday.  Patient denies further questions at this time and voiced appreciation for our call.      Update sent to KIM Pate-CHRISTIAN.    Ema Cain RN  [facility] - RN Care Coordinator  Office:  [phone]

## 2024-11-13 NOTE — ED PROVIDER NOTES
Emergency Department Provider Note        History of Present Illness     History provided by: Patient  Limitations to History: None  External Records Reviewed with Brief Summary:  Medical chart review, including recent primary care provider visit today    HPI:  Idania Orozco is a 69 y.o. female recently seen today at 4:13 PM by her primary care physician,  for possible urinary tract infection, she was seen on 9/24 for symptoms of 2 to 3 days of recurring burning with urination, was sent to her primary care due to negative testing for urinary tract infection, patient was diagnosed with overflow incontinence, follows up with Dr. Kevin of urology, was recently prescribed on Myrbetriq 25 mg for overflow incontinence today however she is yet to  the prescription..  She additionally has a referral to gynecology and a ultrasound of the pelvis transvaginal ordered.  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  She does endorse a current history of half a pack per day smoking, which is reduced since 2018 when she was first diagnosed with lung cancer.  Patient states that she has developed a burning sensation as well as suprapubic pain that is constant in her suprapubic area for the last 2 to 3 days.  She initially was started on antibiotics for suspected urinary tract infection however was called today by her primary care office and told that her urine culture was negative and that she should cease the antibiotic.  She denies nausea denies vomiting denies chest pain, denies shortness of breath, states no blood in stool or urine, denies any changes in bowel habits other than new urinary frequency.  She states that the burning sensation is constant not only when she  is urinating.  Per review of Dr. Vasquez's note from today he had planned on transvaginal transabdominal ultrasound for evaluation of suprapubic pain.  She denies breakthrough vaginal bleeding.    Physical Exam   Triage vitals:  T 36 °C (96.8 °F)  HR 85  /58  RR 18  O2 96 % None (Room air)    General: Awake, alert, in no acute distress  Eyes: Gaze conjugate.  No scleral icterus or injection  HENT: Normo-cephalic, atraumatic. No stridor  CV: Regular rate, regular rhythm. Radial pulses 2+ bilaterally  Resp: Breathing non-labored, speaking in full sentences.  Clear to auscultation bilaterally  GI: Soft, non-distended, non-tender. No rebound or guarding.  : Deferred  MSK/Extremities: No gross bony deformities. Moving all extremities  Skin: Warm. Appropriate color  Neuro: Alert. Oriented. Face symmetric. Speech is fluent.  Gross strength and sensation intact in b/l UE and LEs  Psych: Appropriate mood and affect    Medical Decision Making & ED Course   Medical Decision Makin y.o. female who arrives with a 2 to 3 days of suprapubic pain and burning sensation that is constant without nausea or vomiting and with negative urinalysis results that showed no signs of urinary tract infection at her primary care provider's office today.  She arrives here with continued complaint of urinary frequency has not yet started Myrbetriq.  Urinalysis, CBC, CMP, lipase, CT of the abdomen pelvis to evaluate for intra-abdominal pathology.  As the patient's primary care provider had already scheduled for transvaginal ultrasound we will perform that while the patient is here as well for evaluation of pathology/malignancy given patient's history of cancer and current smoking history.  Patient does have a prescription for Myrbetriq and was advised to pick it up for urinary frequency.  Patient given half a milligram of Dilaudid for pain management as she rates it a 8 out of 10, Dilaudid given out of concern for renal function  given patient's history of CKD 3.  Please see ED course for further medical decision making.  ----      Differential diagnoses considered include but are not limited to: Malignancy, nephrolithiasis, sigmoid colitis     Social Determinants of Health which Significantly Impact Care: None identified       Independent Result Review and Interpretation: Relevant laboratory and radiographic results were reviewed and independently interpreted by myself.  As necessary, they are commented on in the ED Course.    Chronic conditions affecting the patient's care: As documented above in [facility]    The patient was discussed with the following consultants/services: None    Care Considerations: As documented above in [facility]    ED Course:  ED Course as of 11/12/24 2347   Tue Nov 12, 2024 2136 Urinalysis shows no signs of urinary tract infection, negative leukocyte esterase. [PV]   2247 LIPASE(!): 176 [PV]   2257 IMPRESSION:  No acute findings in the abdomen or pelvis.      Incidentally noted right lower lobe pulmonary nodules which are  located near or within the airways measuring 0.7 and 0.6 cm, new  compared to prior. Additionally, there is a right infrahilar lesion  measuring 1.3 x 1.8 cm, possibly a lymph node. Given history of lung  malignancy, metastatic disease is on the differential diagnosis.  Recommend non-emergent dedicated chest CT with IV contrast for  further evaluation.   [PV]   2257 Patient was handed a copy of her CT report, instructed to follow-up with Dr. Vasquez for continued monitoring of infrahilar lower lobe lesion. [PV]   2258 Patient states she does have a follow-up CT scheduled with her oncologist for January.  She was instructed to follow-up with both her primary and with her oncologist.  Patient understands plan of care. [PV]   2337 US PELVIS TRANSABDOMINAL WITH TRANSVAGINAL  FINDINGS:  UTERUS:  Anteverted uterus measuring 4.6 x 2.1 x 2.9 cm. No myometrial masses  identified.      Endometrial thickness:  Endometrial thickness 1-2 mm.      CERVIX:  Normal.      RIGHT OVARY:  Not identified.      LEFT OVARY:  Left ovary size: 2.0 x 1.2 x 1.4 cm      No dominant cyst or ovarian mass. Color/arterial flow is seen.      No adnexal mass seen.      FREE FLUID:   None.      IMPRESSION:  Unremarkable pelvic ultrasound.   [PV]   2337 Results were discussed with the patient, she was advised to start taking Myrbetriq, we do not have Myrbetriq on pharmacy at this facility however the patient states her pharmacy is [facility] and she has a prescription already sent there.  Patient was advised to pick it up and begin treatment today.  All results were discussed with the patient, she understands plan of care, will follow-up as appropriate, given strict return precautions. [PV]   2341 Patient was given referral to [facility] [place] for findings of nonurgent new perihilar lesion.  She also states she can follow-up with her oncologist and has CT imaging scheduled for January.  She additionally has follow-up with nephrology and OB/GYN through her primary care provider. [PV]      ED Course User Index  [PV] Alberto Monzon DO         Diagnoses as of 11/12/24 2347   Suprapubic pain, acute   Pulmonary nodule     Disposition   As a result of the work-up, the patient was discharged home.  she was informed of her diagnosis and instructed to come back with any concerns or worsening of condition.  she and was agreeable to the plan as discussed above.  she was given the opportunity to ask questions.  All of the patient's questions were answered.    Procedures   Procedures    Patient seen and discussed with ED attending physician.    Alberto Monzon DO  Emergency Medicine     Alberto Monzon DO  Resident  11/12/24 2341

## 2024-11-13 NOTE — DISCHARGE INSTRUCTIONS
Please follow-up with your oncologist (cancer doctor) as well as with your primary care doctor to let them know you were seen and evaluated in the emergency department.  Please also follow-up with your kidney doctor (nephrologist) as well as OB GYN as your primary care doctor had scheduled.  Please return to the emergency department if you have increasing concerning symptoms, if you develop incontinence of both bladder and bowel, if you have nausea that cannot stop or if you notice a large amount of blood in stool or urine.  You can always return to any emergency department anytime if you need to be reevaluated.    Thank you for letting me participate in your care  -Dr. GILBERTO Monzon

## 2024-12-11 ENCOUNTER — HOSPITAL ENCOUNTER (OUTPATIENT)
Dept: CT IMAGING | Age: 69
Discharge: HOME | End: 2024-12-13

## 2024-12-11 DIAGNOSIS — C34.11 MALIGNANT NEOPLASM OF UPPER LOBE OF RIGHT LUNG (HCC): ICD-10-CM

## 2024-12-18 ENCOUNTER — HOSPITAL ENCOUNTER (OUTPATIENT)
Dept: CT IMAGING | Age: 69
Discharge: HOME | End: 2024-12-20
Payer: MEDICARE

## 2024-12-18 DIAGNOSIS — C34.11 MALIGNANT NEOPLASM OF RIGHT UPPER LOBE OF LUNG (HCC): ICD-10-CM

## 2024-12-18 PROCEDURE — 3430000000 HC RX DIAGNOSTIC RADIOPHARMACEUTICAL: Performed by: INTERNAL MEDICINE

## 2024-12-18 PROCEDURE — A9609 HC RX DIAGNOSTIC RADIOPHARMACEUTICAL: HCPCS | Performed by: INTERNAL MEDICINE

## 2024-12-18 PROCEDURE — 78815 PET IMAGE W/CT SKULL-THIGH: CPT

## 2024-12-18 RX ORDER — FLUDEOXYGLUCOSE F 18 200 MCI/ML
17.7 INJECTION, SOLUTION INTRAVENOUS
Status: COMPLETED | OUTPATIENT
Start: 2024-12-18 | End: 2024-12-18

## 2024-12-18 RX ADMIN — FLUDEOXYGLUCOSE F 18 17.7 MILLICURIE: 200 INJECTION, SOLUTION INTRAVENOUS at 14:08

## 2024-12-25 NOTE — PROGRESS NOTES
PCP: Yan Vasquez DO (referred by)          CHIEF COMPLAINT:          HISTORY OF PRESENT ILLNESS:  This is a 69 y.o. female, No obstetric history on file. who presents with overflow incontinence. She was previously seen by Yan Vasquez DO, who referred her to our office on [11/12/24].     Overflow incontinence  Recurrent UTIs  Nocturia  Urinary frequency  Treated with Myrbetriq (25mg)    Transabdominal, transvaginal pelvic US findings are as follows:    FINDINGS:  UTERUS:  Anteverted uterus measuring 4.6 x 2.1 x 2.9 cm. No myometrial fanny identified. Endometrial thickness:  Endometrial thickness 1-2 mm.  CERVIX:  Normal.  RIGHT OVARY:  Not identified.  LEFT OVARY:  Left ovary size: 2.0 x 1.2 x 1.4 cm. No dominant cyst or ovarian mass. Color/arterial flow is seen. No adnexal mass seen.  FREE FLUID: None.  IMPRESSION:  Unremarkable pelvic ultrasound.      Peeing more than usual and pressure in her bladder since beginning of November. Had pelvic ultrasound that was unremarkable.    Voids when she wakes, then 5 minutes later has to go. Going way more during the day then she ever did before, at least 3 times an hour, going small amounts. Feels like she empties her bladder all the way but finds herself pushing on her abdomen to try and empty more. Up 3-4 times a night, this is new, previously only once a night. Denies urinary leakage. Wears a mini pad sometimes leaks with coughing/laughing/sneezing, worse over the last 3-4 months. Has not been evaluated for bladder issues before. Started on Myrbetriq by family doctor beginning of November, has not noticed any difference in her symptoms. Denies hematuria.    Drinks ice tea with caffeine (about 16 oz a day), but trying to drink more water over the last 4 months since being diagnosed with stage 3 CKD. Denies soda, no coffee. Also has history of lung cancer, just told they found a recurrence on her PET scan, will be having a biopsy soon.    No bowel issues. Not  sexually active, no previous issues. Denies HRT. Denies prolapse symptoms.        PHYSICAL EXAMINATION:  No LMP recorded.  There is no height or weight on file to calculate BMI.There were no vitals filed for this visit.    General Appearance: well appearing  Neuro: Alert and oriented     Pelvic:  Genitourinary: normal external genitalia, Bartholin's glands negative, Greentree's glands negative  Urethra: normal meatus, non-tender, no periurethral mass  Vaginal mucosa: no ulcerations or lesions  Cervix: normal  Atrophy: positive    Pelvic floor muscle contraction  3/5  No pain over levators bilaterally on palpation  Palpation of anterior vaginal wall reproduces bladder pressure and sensation to void with some pain    No prolapse on exam    Rectal: deferred    PVR (by Ultrasound): 0 mL   Urine dip: No results found for this or any previous visit (from the past 24 hours). Patient voided right before office visit and was unable to void again.      IMPRESSION AND PLAN:  Idania Orozco is a 69 y.o. who presents with overactive bladder.    - Will stop Myrbetriq  - Given samples of Gemtesa  - Bladder diary  - Office cystoscopy given acute onset of symptoms and medical history of cancer    Follow up for cystoscopy.    Patient evaluated and plan discussed with Dr. Kevin.    Capri Gayle MD, KEILY, BSN  URPS Fellow, PGY-5    I saw and evaluated the patient. I personally obtained the key and critical portions of the history and physical exam or was physically present for key and critical portions performed by the resident/fellow. I reviewed the resident/fellow's documentation and discussed the patient with the resident/fellow. I agree with the resident/fellow's medical decision making as documented in the note.   Evaristo Kevin MD     SIGNATURES  Evaristo Kevin MD  [phone]

## 2024-12-26 ENCOUNTER — APPOINTMENT (OUTPATIENT)
Dept: UROLOGY | Facility: CLINIC | Age: 69
End: 2024-12-26
Payer: MEDICARE

## 2024-12-26 VITALS
HEIGHT: 62 IN | HEART RATE: 89 BPM | BODY MASS INDEX: 28.19 KG/M2 | WEIGHT: 153.2 LBS | SYSTOLIC BLOOD PRESSURE: 95 MMHG | DIASTOLIC BLOOD PRESSURE: 65 MMHG

## 2024-12-26 DIAGNOSIS — R35.0 URINARY FREQUENCY: ICD-10-CM

## 2024-12-26 DIAGNOSIS — R32 URINARY INCONTINENCE, UNSPECIFIED TYPE: ICD-10-CM

## 2024-12-26 PROCEDURE — G2211 COMPLEX E/M VISIT ADD ON: HCPCS | Performed by: UROLOGY

## 2024-12-26 PROCEDURE — 51798 US URINE CAPACITY MEASURE: CPT | Performed by: UROLOGY

## 2024-12-26 PROCEDURE — 3008F BODY MASS INDEX DOCD: CPT | Performed by: UROLOGY

## 2024-12-26 PROCEDURE — 99204 OFFICE O/P NEW MOD 45 MIN: CPT | Performed by: UROLOGY

## 2024-12-26 PROCEDURE — 3074F SYST BP LT 130 MM HG: CPT | Performed by: UROLOGY

## 2024-12-26 PROCEDURE — 3078F DIAST BP <80 MM HG: CPT | Performed by: UROLOGY

## 2024-12-26 RX ORDER — MIRABEGRON 25 MG/1
TABLET, FILM COATED, EXTENDED RELEASE ORAL
COMMUNITY
Start: 2024-12-11

## 2025-01-02 ENCOUNTER — APPOINTMENT (OUTPATIENT)
Dept: UROLOGY | Facility: CLINIC | Age: 70
End: 2025-01-02
Payer: MEDICARE

## 2025-01-06 ENCOUNTER — TELEPHONE (OUTPATIENT)
Dept: PRIMARY CARE | Facility: CLINIC | Age: 70
End: 2025-01-06
Payer: MEDICARE

## 2025-01-13 ENCOUNTER — PREP FOR PROCEDURE (OUTPATIENT)
Dept: PULMONOLOGY | Age: 70
End: 2025-01-13

## 2025-01-13 ENCOUNTER — OFFICE VISIT (OUTPATIENT)
Dept: PULMONOLOGY | Age: 70
End: 2025-01-13

## 2025-01-13 ENCOUNTER — HOSPITAL ENCOUNTER (OUTPATIENT)
Dept: CT IMAGING | Age: 70
Discharge: HOME | End: 2025-01-15
Attending: INTERNAL MEDICINE
Payer: MEDICARE

## 2025-01-13 VITALS
HEIGHT: 62 IN | TEMPERATURE: 97.6 F | BODY MASS INDEX: 36.99 KG/M2 | WEIGHT: 201 LBS | DIASTOLIC BLOOD PRESSURE: 72 MMHG | SYSTOLIC BLOOD PRESSURE: 125 MMHG | OXYGEN SATURATION: 97 % | HEART RATE: 97 BPM

## 2025-01-13 DIAGNOSIS — Z71.6 TOBACCO ABUSE COUNSELING: ICD-10-CM

## 2025-01-13 DIAGNOSIS — R91.1 LUNG NODULE: Primary | ICD-10-CM

## 2025-01-13 DIAGNOSIS — J44.9 CHRONIC OBSTRUCTIVE PULMONARY DISEASE, UNSPECIFIED COPD TYPE (HCC): ICD-10-CM

## 2025-01-13 DIAGNOSIS — R06.02 SHORTNESS OF BREATH: ICD-10-CM

## 2025-01-13 DIAGNOSIS — Z72.0 TOBACCO ABUSE: ICD-10-CM

## 2025-01-13 DIAGNOSIS — R91.1 LUNG NODULE: ICD-10-CM

## 2025-01-13 PROCEDURE — 71250 CT THORAX DX C-: CPT

## 2025-01-13 NOTE — PROGRESS NOTES
Current packs/day: 1.00     Average packs/day: 1 pack/day for 30.0 years (30.0 ttl pk-yrs)     Types: Cigarettes    Smokeless tobacco: Never   Substance Use Topics    Alcohol use: No       Family History  Family History   Problem Relation Age of Onset    Stroke Father     Stroke Brother     Stroke Brother     Heart Disease Brother         3 open heart surgeries    Cancer Mother         ovarian    Cancer Sister         breast       Review of Systems  All review of systems has been obtained and negative other than what was mentionedin HPI.   Physical Exam                 Vitals:    01/13/25 1437   BP: 125/72   Pulse: 97   Temp: 97.6 °F (36.4 °C)   TempSrc: Tympanic   SpO2: 97%   Weight: 91.2 kg (201 lb)   Height: 1.575 m (5' 2.01\")          General appearance: Well appearing. No acute distress. AAOX3  Head: Normocephalic, without obvious abnormality, atraumatic   Eyes:Pupils bilateral equal and reactive, EOM intact. Normal sclera and conjunctiva   Nose: Mucosa pink   Neck: Supple, No JVD. Nothyromegaly. Neck is thin  Lungs: Clear bilaterally. No wheezing. No crackles. No use of accessory muscles.   Heart: RRR, S1, S2 normal, no murmur, click, rub or gallop   Abdomen: soft, non-tender, nondistended. Bowel sounds normal. No hernia. No organomegaly.   Extremities: extremities normal, atraumatic, no cyanosis, no edema  Skin: Skin color, texture, turgor normal. No rashes or lesions   Neurological: No focal deficits,cranial nerves grossly intact. No weakness. Sensation normal   Psych: Normal Mood  Musculoskeletal: No joint abnormalities.               Impression:   Diagnosis Orders   1. Lung nodule  CT CHEST WO CONTRAST    Full PFT Study With Bronchodilator      2. Chronic obstructive pulmonary disease, unspecified COPD type (HCC)  Full PFT Study With Bronchodilator      3. Shortness of breath        4. Tobacco abuse        5. Tobacco abuse counseling                 Orders Placed This Encounter   Procedures    CT CHEST WO

## 2025-01-17 ENCOUNTER — ANESTHESIA (OUTPATIENT)
Dept: OPERATING ROOM | Age: 70
End: 2025-01-17
Payer: MEDICARE

## 2025-01-17 ENCOUNTER — ANESTHESIA EVENT (OUTPATIENT)
Dept: OPERATING ROOM | Age: 70
End: 2025-01-17
Payer: MEDICARE

## 2025-01-17 ENCOUNTER — APPOINTMENT (OUTPATIENT)
Dept: GENERAL RADIOLOGY | Age: 70
End: 2025-01-17
Attending: INTERNAL MEDICINE
Payer: MEDICARE

## 2025-01-17 ENCOUNTER — HOSPITAL ENCOUNTER (OUTPATIENT)
Age: 70
Setting detail: OUTPATIENT SURGERY
Discharge: HOME | End: 2025-01-17
Attending: INTERNAL MEDICINE | Admitting: INTERNAL MEDICINE
Payer: MEDICARE

## 2025-01-17 VITALS
TEMPERATURE: 97.3 F | HEIGHT: 62 IN | HEART RATE: 79 BPM | WEIGHT: 152 LBS | OXYGEN SATURATION: 96 % | SYSTOLIC BLOOD PRESSURE: 118 MMHG | DIASTOLIC BLOOD PRESSURE: 67 MMHG | BODY MASS INDEX: 27.97 KG/M2 | RESPIRATION RATE: 18 BRPM

## 2025-01-17 DIAGNOSIS — R91.1 LUNG NODULE: ICD-10-CM

## 2025-01-17 DIAGNOSIS — R52 PAIN: ICD-10-CM

## 2025-01-17 LAB
INR PPP: 1
PROTHROMBIN TIME: 13.6 SEC (ref 12.3–14.9)

## 2025-01-17 PROCEDURE — 2500000003 HC RX 250 WO HCPCS: Performed by: INTERNAL MEDICINE

## 2025-01-17 PROCEDURE — C1725 CATH, TRANSLUMIN NON-LASER: HCPCS | Performed by: INTERNAL MEDICINE

## 2025-01-17 PROCEDURE — 3700000000 HC ANESTHESIA ATTENDED CARE: Performed by: INTERNAL MEDICINE

## 2025-01-17 PROCEDURE — 2500000003 HC RX 250 WO HCPCS: Performed by: ANESTHESIOLOGIST ASSISTANT

## 2025-01-17 PROCEDURE — 6360000002 HC RX W HCPCS: Performed by: INTERNAL MEDICINE

## 2025-01-17 PROCEDURE — 2709999900 HC NON-CHARGEABLE SUPPLY: Performed by: INTERNAL MEDICINE

## 2025-01-17 PROCEDURE — 88173 CYTOPATH EVAL FNA REPORT: CPT

## 2025-01-17 PROCEDURE — 88331 PATH CONSLTJ SURG 1 BLK 1SPC: CPT

## 2025-01-17 PROCEDURE — 6360000002 HC RX W HCPCS: Performed by: ANESTHESIOLOGIST ASSISTANT

## 2025-01-17 PROCEDURE — 88305 TISSUE EXAM BY PATHOLOGIST: CPT

## 2025-01-17 PROCEDURE — 7100000000 HC PACU RECOVERY - FIRST 15 MIN: Performed by: INTERNAL MEDICINE

## 2025-01-17 PROCEDURE — 2720000010 HC SURG SUPPLY STERILE: Performed by: INTERNAL MEDICINE

## 2025-01-17 PROCEDURE — 3603165200 HC BRNCHSC EBUS GUIDED SAMPL 1/2 NODE STATION/STRUX: Performed by: INTERNAL MEDICINE

## 2025-01-17 PROCEDURE — 7100000011 HC PHASE II RECOVERY - ADDTL 15 MIN: Performed by: INTERNAL MEDICINE

## 2025-01-17 PROCEDURE — 71045 X-RAY EXAM CHEST 1 VIEW: CPT

## 2025-01-17 PROCEDURE — 88341 IMHCHEM/IMCYTCHM EA ADD ANTB: CPT

## 2025-01-17 PROCEDURE — 7100000001 HC PACU RECOVERY - ADDTL 15 MIN: Performed by: INTERNAL MEDICINE

## 2025-01-17 PROCEDURE — 2580000003 HC RX 258: Performed by: STUDENT IN AN ORGANIZED HEALTH CARE EDUCATION/TRAINING PROGRAM

## 2025-01-17 PROCEDURE — 85610 PROTHROMBIN TIME: CPT

## 2025-01-17 PROCEDURE — 7100000010 HC PHASE II RECOVERY - FIRST 15 MIN: Performed by: INTERNAL MEDICINE

## 2025-01-17 PROCEDURE — 3700000001 HC ADD 15 MINUTES (ANESTHESIA): Performed by: INTERNAL MEDICINE

## 2025-01-17 PROCEDURE — 88342 IMHCHEM/IMCYTCHM 1ST ANTB: CPT

## 2025-01-17 RX ORDER — SODIUM CHLORIDE 0.9 % (FLUSH) 0.9 %
5-40 SYRINGE (ML) INJECTION EVERY 12 HOURS SCHEDULED
Status: DISCONTINUED | OUTPATIENT
Start: 2025-01-17 | End: 2025-01-17 | Stop reason: HOSPADM

## 2025-01-17 RX ORDER — SODIUM CHLORIDE 9 MG/ML
INJECTION, SOLUTION INTRAVENOUS PRN
Status: DISCONTINUED | OUTPATIENT
Start: 2025-01-17 | End: 2025-01-17 | Stop reason: HOSPADM

## 2025-01-17 RX ORDER — DIPHENHYDRAMINE HYDROCHLORIDE 50 MG/ML
12.5 INJECTION INTRAMUSCULAR; INTRAVENOUS
Status: DISCONTINUED | OUTPATIENT
Start: 2025-01-17 | End: 2025-01-17 | Stop reason: HOSPADM

## 2025-01-17 RX ORDER — LIDOCAINE HYDROCHLORIDE 20 MG/ML
INJECTION, SOLUTION EPIDURAL; INFILTRATION; INTRACAUDAL; PERINEURAL PRN
Status: DISCONTINUED | OUTPATIENT
Start: 2025-01-17 | End: 2025-01-17 | Stop reason: HOSPADM

## 2025-01-17 RX ORDER — FENTANYL CITRATE 0.05 MG/ML
50 INJECTION, SOLUTION INTRAMUSCULAR; INTRAVENOUS EVERY 10 MIN PRN
Status: DISCONTINUED | OUTPATIENT
Start: 2025-01-17 | End: 2025-01-17 | Stop reason: HOSPADM

## 2025-01-17 RX ORDER — PROPOFOL 10 MG/ML
INJECTION, EMULSION INTRAVENOUS
Status: DISCONTINUED | OUTPATIENT
Start: 2025-01-17 | End: 2025-01-17 | Stop reason: SDUPTHER

## 2025-01-17 RX ORDER — MAGNESIUM HYDROXIDE 1200 MG/15ML
LIQUID ORAL CONTINUOUS PRN
Status: DISCONTINUED | OUTPATIENT
Start: 2025-01-17 | End: 2025-01-17 | Stop reason: HOSPADM

## 2025-01-17 RX ORDER — MIRABEGRON 25 MG/1
TABLET, FILM COATED, EXTENDED RELEASE ORAL
COMMUNITY
Start: 2024-12-11

## 2025-01-17 RX ORDER — METOCLOPRAMIDE HYDROCHLORIDE 5 MG/ML
10 INJECTION INTRAMUSCULAR; INTRAVENOUS
Status: DISCONTINUED | OUTPATIENT
Start: 2025-01-17 | End: 2025-01-17 | Stop reason: HOSPADM

## 2025-01-17 RX ORDER — ROCURONIUM BROMIDE 10 MG/ML
INJECTION, SOLUTION INTRAVENOUS
Status: DISCONTINUED | OUTPATIENT
Start: 2025-01-17 | End: 2025-01-17 | Stop reason: SDUPTHER

## 2025-01-17 RX ORDER — ONDANSETRON 2 MG/ML
INJECTION INTRAMUSCULAR; INTRAVENOUS
Status: DISCONTINUED | OUTPATIENT
Start: 2025-01-17 | End: 2025-01-17 | Stop reason: SDUPTHER

## 2025-01-17 RX ORDER — SODIUM CHLORIDE 0.9 % (FLUSH) 0.9 %
5-40 SYRINGE (ML) INJECTION PRN
Status: DISCONTINUED | OUTPATIENT
Start: 2025-01-17 | End: 2025-01-17 | Stop reason: HOSPADM

## 2025-01-17 RX ORDER — FENTANYL CITRATE 50 UG/ML
INJECTION, SOLUTION INTRAMUSCULAR; INTRAVENOUS
Status: DISCONTINUED | OUTPATIENT
Start: 2025-01-17 | End: 2025-01-17 | Stop reason: SDUPTHER

## 2025-01-17 RX ORDER — LIDOCAINE HYDROCHLORIDE 10 MG/ML
INJECTION, SOLUTION EPIDURAL; INFILTRATION; INTRACAUDAL; PERINEURAL
Status: DISCONTINUED | OUTPATIENT
Start: 2025-01-17 | End: 2025-01-17 | Stop reason: SDUPTHER

## 2025-01-17 RX ORDER — ONDANSETRON 2 MG/ML
4 INJECTION INTRAMUSCULAR; INTRAVENOUS
Status: DISCONTINUED | OUTPATIENT
Start: 2025-01-17 | End: 2025-01-17 | Stop reason: HOSPADM

## 2025-01-17 RX ORDER — OXYCODONE HYDROCHLORIDE 5 MG/1
5 TABLET ORAL
Status: DISCONTINUED | OUTPATIENT
Start: 2025-01-17 | End: 2025-01-17 | Stop reason: HOSPADM

## 2025-01-17 RX ORDER — DEXAMETHASONE SODIUM PHOSPHATE 4 MG/ML
INJECTION, SOLUTION INTRA-ARTICULAR; INTRALESIONAL; INTRAMUSCULAR; INTRAVENOUS; SOFT TISSUE
Status: DISCONTINUED | OUTPATIENT
Start: 2025-01-17 | End: 2025-01-17 | Stop reason: SDUPTHER

## 2025-01-17 RX ORDER — MEPERIDINE HYDROCHLORIDE 25 MG/ML
12.5 INJECTION INTRAMUSCULAR; INTRAVENOUS; SUBCUTANEOUS
Status: DISCONTINUED | OUTPATIENT
Start: 2025-01-17 | End: 2025-01-17 | Stop reason: HOSPADM

## 2025-01-17 RX ORDER — LIDOCAINE HYDROCHLORIDE 10 MG/ML
1 INJECTION, SOLUTION EPIDURAL; INFILTRATION; INTRACAUDAL; PERINEURAL
Status: DISCONTINUED | OUTPATIENT
Start: 2025-01-17 | End: 2025-01-17 | Stop reason: HOSPADM

## 2025-01-17 RX ORDER — FLUTICASONE FUROATE, UMECLIDINIUM BROMIDE AND VILANTEROL TRIFENATATE 200; 62.5; 25 UG/1; UG/1; UG/1
1 POWDER RESPIRATORY (INHALATION) DAILY
COMMUNITY
Start: 2021-09-27 | End: 2025-07-23

## 2025-01-17 RX ORDER — MIDAZOLAM HYDROCHLORIDE 1 MG/ML
INJECTION, SOLUTION INTRAMUSCULAR; INTRAVENOUS
Status: DISCONTINUED | OUTPATIENT
Start: 2025-01-17 | End: 2025-01-17 | Stop reason: SDUPTHER

## 2025-01-17 RX ORDER — TRAZODONE HYDROCHLORIDE 100 MG/1
100 TABLET ORAL NIGHTLY
COMMUNITY
Start: 2024-12-17

## 2025-01-17 RX ORDER — NALOXONE HYDROCHLORIDE 0.4 MG/ML
INJECTION, SOLUTION INTRAMUSCULAR; INTRAVENOUS; SUBCUTANEOUS PRN
Status: DISCONTINUED | OUTPATIENT
Start: 2025-01-17 | End: 2025-01-17 | Stop reason: HOSPADM

## 2025-01-17 RX ORDER — ALENDRONATE SODIUM 70 MG/1
70 TABLET ORAL
COMMUNITY
Start: 2024-08-05 | End: 2025-08-05

## 2025-01-17 RX ADMIN — SODIUM CHLORIDE 50 ML/HR: 9 INJECTION, SOLUTION INTRAVENOUS at 09:44

## 2025-01-17 RX ADMIN — DEXAMETHASONE SODIUM PHOSPHATE 4 MG: 4 INJECTION INTRA-ARTICULAR; INTRALESIONAL; INTRAMUSCULAR; INTRAVENOUS; SOFT TISSUE at 10:58

## 2025-01-17 RX ADMIN — FENTANYL CITRATE 25 MCG: 50 INJECTION, SOLUTION INTRAMUSCULAR; INTRAVENOUS at 11:50

## 2025-01-17 RX ADMIN — FENTANYL CITRATE 25 MCG: 50 INJECTION, SOLUTION INTRAMUSCULAR; INTRAVENOUS at 10:53

## 2025-01-17 RX ADMIN — FENTANYL CITRATE 25 MCG: 50 INJECTION, SOLUTION INTRAMUSCULAR; INTRAVENOUS at 11:07

## 2025-01-17 RX ADMIN — FENTANYL CITRATE 25 MCG: 50 INJECTION, SOLUTION INTRAMUSCULAR; INTRAVENOUS at 11:44

## 2025-01-17 RX ADMIN — MIDAZOLAM HYDROCHLORIDE 2 MG: 1 INJECTION, SOLUTION INTRAMUSCULAR; INTRAVENOUS at 10:48

## 2025-01-17 RX ADMIN — PROPOFOL 150 MG: 10 INJECTION, EMULSION INTRAVENOUS at 10:53

## 2025-01-17 RX ADMIN — ROCURONIUM BROMIDE 50 MG: 10 INJECTION, SOLUTION INTRAVENOUS at 10:53

## 2025-01-17 RX ADMIN — SUGAMMADEX 200 MG: 100 INJECTION, SOLUTION INTRAVENOUS at 11:49

## 2025-01-17 RX ADMIN — LIDOCAINE HYDROCHLORIDE 50 MG: 10 INJECTION, SOLUTION EPIDURAL; INFILTRATION; INTRACAUDAL; PERINEURAL at 10:53

## 2025-01-17 RX ADMIN — ONDANSETRON 4 MG: 2 INJECTION, SOLUTION INTRAMUSCULAR; INTRAVENOUS at 11:34

## 2025-01-17 ASSESSMENT — LIFESTYLE VARIABLES: SMOKING_STATUS: 1

## 2025-01-17 ASSESSMENT — PAIN SCALES - GENERAL
PAINLEVEL_OUTOF10: 0
PAINLEVEL_OUTOF10: 0

## 2025-01-17 NOTE — H&P
See office note on 1/13/25:    RLL nodule and Right hilar mass. Here for Endobronchial ultrasound and navigational bronchoscopy.     Electronically signed by Fariba Waite MD on 1/17/2025 at 10:44 AM

## 2025-01-17 NOTE — ANESTHESIA POSTPROCEDURE EVALUATION
nDepartment of Anesthesiology  Postprocedure Note    Patient: Shaylee Jordan  MRN: [id number]  YOB: 1955  Date of evaluation: 1/17/2025    Procedure Summary       Date: 01/17/25 Room / Location: [address]    Anesthesia Start: 1048 Anesthesia Stop: 1159    Procedure: Bronch, EBUS, W/PIPPA c-arm (Mouth) Diagnosis:       Lung nodule      (Lung nodule [R91.1])    Surgeons: Fariba Waite MD Responsible Provider: Hilario Spence DO    Anesthesia Type: general ASA Status: 3            Anesthesia Type: No value filed.    Silas Phase I: Silas Score: 10    Silas Phase II:      Anesthesia Post Evaluation    Patient location during evaluation: bedside  Patient participation: complete - patient participated  Level of consciousness: awake and awake and alert  Pain score: 0  Airway patency: patent  Nausea & Vomiting: no nausea and no vomiting  Cardiovascular status: blood pressure returned to baseline and hemodynamically stable  Respiratory status: acceptable  Hydration status: euvolemic  Pain management: adequate        No notable events documented.

## 2025-01-17 NOTE — ANESTHESIA PRE PROCEDURE
Department of Anesthesiology  Preprocedure Note       Name:  Shaylee Jordan   Age:  69 y.o.  :  1955                                          MRN:  [id number]         Date:  2025      Surgeon: Surgeon(s):  Fariba Waite MD    Procedure: Procedure(s):  Bronch, EBUS, W/PIPPA c-arm  ADARSH AWARE    Medications prior to admission:   Prior to Admission medications    Medication Sig Start Date End Date Taking? Authorizing Provider   alendronate (FOSAMAX) 70 MG tablet Take 1 tablet by mouth every 7 days 24 Yes ProviderJamison MD   fluticasone-umeclidin-vilant (TRELEGY ELLIPTA) 200-62.5-25 MCG/ACT AEPB inhaler Inhale 1 puff into the lungs daily 21 Yes ProviderJamison MD   mirabegron (MYRBETRIQ) 25 MG TB24  24  Yes ProviderJamison MD   traZODone (DESYREL) 100 MG tablet Take 1 tablet by mouth nightly at bedtime. 24  Yes Provider, MD Jamison   prochlorperazine (COMPAZINE) 10 MG tablet Take 1 tablet by mouth every 6 hours as needed (nausea) Take 1 tab every 6 hours as needed for nausea 19  Yes Klever Stevenson MD   DULoxetine (CYMBALTA) 60 MG extended release capsule Take 1 capsule by mouth daily   Yes ProviderJamison MD   montelukast (SINGULAIR) 10 MG tablet Take 1 tablet by mouth daily   Yes ProviderJamison MD   Multiple Vitamins-Minerals (MULTIVITAMIN ADULTS 50+) TABS Take 1 tablet by mouth daily   Yes Provider, MD Jamison   simvastatin (ZOCOR) 40 MG tablet Take 1 tablet by mouth nightly   Yes Provider, MD Jamison   albuterol (PROVENTIL) (5 MG/ML) 0.5% nebulizer solution Take 1 mL by nebulization 4 times daily as needed for Wheezing 7/2/15  Yes Juice Melendez MD   lisinopril-hydrochlorothiazide (PRINZIDE) 20-12.5 MG per tablet Take 1 tablet by mouth daily 6/16/15  Yes Juice Melendez MD   ALPRAZolam (XANAX) 0.5 MG tablet Take one tablet PO four times daily PRN 6/2/15  Yes Juice Melendez MD   albuterol (PROAIR HFA) 108 (90

## 2025-01-28 ENCOUNTER — OFFICE VISIT (OUTPATIENT)
Dept: PULMONOLOGY | Age: 70
End: 2025-01-28
Payer: OTHER GOVERNMENT

## 2025-01-28 VITALS
HEART RATE: 68 BPM | OXYGEN SATURATION: 98 % | BODY MASS INDEX: 27.42 KG/M2 | TEMPERATURE: 97.6 F | SYSTOLIC BLOOD PRESSURE: 116 MMHG | DIASTOLIC BLOOD PRESSURE: 72 MMHG | HEIGHT: 62 IN | WEIGHT: 149 LBS

## 2025-01-28 DIAGNOSIS — R06.02 SHORTNESS OF BREATH: ICD-10-CM

## 2025-01-28 DIAGNOSIS — C80.1 MALIGNANT SMALL CELL CANCER (HCC): Primary | ICD-10-CM

## 2025-01-28 DIAGNOSIS — J44.9 CHRONIC OBSTRUCTIVE PULMONARY DISEASE, UNSPECIFIED COPD TYPE (HCC): ICD-10-CM

## 2025-01-28 PROCEDURE — G8400 PT W/DXA NO RESULTS DOC: HCPCS | Performed by: INTERNAL MEDICINE

## 2025-01-28 PROCEDURE — 3074F SYST BP LT 130 MM HG: CPT | Performed by: INTERNAL MEDICINE

## 2025-01-28 PROCEDURE — 4004F PT TOBACCO SCREEN RCVD TLK: CPT | Performed by: INTERNAL MEDICINE

## 2025-01-28 PROCEDURE — 1123F ACP DISCUSS/DSCN MKR DOCD: CPT | Performed by: INTERNAL MEDICINE

## 2025-01-28 PROCEDURE — 3017F COLORECTAL CA SCREEN DOC REV: CPT | Performed by: INTERNAL MEDICINE

## 2025-01-28 PROCEDURE — 1159F MED LIST DOCD IN RCRD: CPT | Performed by: INTERNAL MEDICINE

## 2025-01-28 PROCEDURE — 1090F PRES/ABSN URINE INCON ASSESS: CPT | Performed by: INTERNAL MEDICINE

## 2025-01-28 PROCEDURE — 3078F DIAST BP <80 MM HG: CPT | Performed by: INTERNAL MEDICINE

## 2025-01-28 PROCEDURE — 99213 OFFICE O/P EST LOW 20 MIN: CPT | Performed by: INTERNAL MEDICINE

## 2025-01-28 PROCEDURE — G8427 DOCREV CUR MEDS BY ELIG CLIN: HCPCS | Performed by: INTERNAL MEDICINE

## 2025-01-28 PROCEDURE — G8417 CALC BMI ABV UP PARAM F/U: HCPCS | Performed by: INTERNAL MEDICINE

## 2025-01-28 PROCEDURE — 3023F SPIROM DOC REV: CPT | Performed by: INTERNAL MEDICINE

## 2025-01-28 NOTE — PROGRESS NOTES
PATIENT VISIT-PULMONARY/SLEEP    1/29/2025     REFERRING PHYSICIAN:  Yossi Carpio DO     REASON FOR REFERRAL: Lung mass    HPI:     Shaylee Jordan is a 69 y.o. female who was referred to pulmonary clinic for evaluation.     She has a history of lung cancer diagnosed in 2018.  Apparently she was treated at [facility] at that time.  She describes having what seems to be with resection followed by chemotherapy.  She has been following up with oncology and has been getting CT surveillance.  She had some abdominal pain recently and underwent a CT abdomen in November 2024.  There was concern of incidental finding in the lung.  There is right infrahilar mass versus adenopathy in the right lower lobe nodule.  She underwent PET scan which I reviewed.  And interpret the results independently.  There is intense uptake in the right hilar area in addition to increased uptake in right lower lobe nodule.  Denies any hemoptysis.  Has a history of COPD.  Has been on inhalers.  She continues to smoke daily.  She denies any weight loss.  She has cough and shortness of breath with minimal activities.          1/28/25:      Comes for follow-up.  Underwent a biopsy in the hospital which was suggestive of small cell lung cancer.  She has an appointment with oncology in couple weeks.  Brain MRI has been ordered.  PFTs ordered as well.  Feels well overall.  She understand the results and had a chance to ask all questions.          Past Medical History   Past Medical History:   Diagnosis Date    Allergic rhinitis     Anxiety     Chronic knee pain, states needs knee replacement right knee 11/19/2012    COPD (chronic obstructive pulmonary disease) (HCC)     Depression     Hyperlipidemia 11/27/2012    Osteoarthritis     Tobacco abuse 6/19/2015       Past Surgical History  Past Surgical History:   Procedure Laterality Date    BRONCHOSCOPY N/A 1/17/2025    Bronch, EBUS, W/PIPPA c-arm performed by Fariba Waite MD at [facility] [place]

## 2025-01-31 NOTE — PROGRESS NOTES
Subjective   Patient ID: Idania Orozco is a 68 y.o. female who presents for Follow-up.  HPI  Patient is also here for follow-up of hypertension.. Symptoms do not include headache confusion visual disturbance dizziness shortness of breath chest pain syncope or palpitations. Recent blood pressure patient has   been checking.  120/60  By report there is good compliance with treatment. Pertinent medical history includes  /hyperlipidemia..  Patient is also here for follow-up of hyperlipidemia. The most recent measurements for this patient would take it on..      .... Associated symptoms do not include chest pain, Cramps with exertion, myalgias or nausea. Current treatment includes statins. By report there is good compliance with treatment. Pertinent medical history includes  hypertension  .  Daily  walk    and    pushed self to  get weight  off and  lost  75   pounds...     dietary modification   and mood is good   Review of Systems   Constitutional:  Negative for fatigue.   HENT:  Negative for congestion.    Respiratory:  Negative for cough.    Cardiovascular:  Negative for chest pain and leg swelling.   Gastrointestinal:  Negative for blood in stool, constipation and diarrhea.   Endocrine: Negative for cold intolerance and heat intolerance.   Genitourinary:  Negative for vaginal bleeding.   Musculoskeletal:  Positive for arthralgias and back pain.   Neurological:  Negative for dizziness, seizures, speech difficulty and light-headedness.       Objective   Physical Exam  Vitals: I have reviewed the vitals  General: Well-developed.  In no acute distress.  Eyes:   sclera nonicteric.  Conjunctiva not injected.  No discharge.   HEAD: Normocephalic, atraumatic.  HEENT   Mucous membranes moist.  Posterior oropharynx nonerythematous, no tonsillar exudates.      No cervical lymphadenopathy.  Cardio: Regular rate and rhythm.  No murmur, rub or gallop.  Pulmonary: Lungs clear to auscultation in all fields.  No accessory muscle  use.  GI/: Normal active bowel sounds.  Soft, nontender.  No masses or organomegaly appreciated.  Musculoskeletal: No gross deformities appreciated.  Neuro: Alert, age-appropriate.  Normal muscle tone.  Moving all extremities.  Skin: No rash, bruises or lesions.   Labs      Dated 7/Results of lab test 7/18/24 showed BUN elevated 45 creatinine 1.3 with GFR of 42 with sodium 137 potassium 4.6 calcium 9.8 AST 13 ALT 10 alk phos 63 with glucose 90 protein normal at 7.1 globulin 2.8 iron testing 49% saturation low at 12 TIBC 410 with CEA slightly elevated at 15.3    Hemoglobin 0.6 32.3 platelets 2 68,000  Assessment/Plan   Problem List Items Addressed This Visit       Allergic rhinitis    Relevant Medications    cetirizine (ZyrTEC) 10 mg tablet    Colon polyp     In light of anemia perhaps both upper and lower endoscopy would be appropriate referral given and discussed with patient         COPD (chronic obstructive pulmonary disease) (Multi)    Relevant Medications    predniSONE (Deltasone) 20 mg tablet    fluticasone-umeclidin-vilanter (Trelegy Ellipta) 200-62.5-25 mcg blister with device    albuterol 90 mcg/actuation inhaler    Hyperlipidemia    Relevant Medications    simvastatin (Zocor) 40 mg tablet    Other Relevant Orders    Lipid Panel    Creatine Kinase    Lumbar radiculopathy, right    Relevant Medications    DULoxetine (Cymbalta) 60 mg DR capsule    Secondary malignant neoplasm of left lung (Multi)    HTN (hypertension)    Relevant Medications    lisinopriL-hydrochlorothiazide 20-12.5 mg tablet    Demyelinating disease of central nervous system, unspecified (Multi)    Major depressive disorder, recurrent, moderate (Multi) - Primary    Stage 3a chronic kidney disease (Multi)     GFR dated 7/24 was 42 with previous GFR dated 1/21 at 47 with GFR discussed with patient kidney function with normal function overs 60 and recent deterioration in kidney function and explained causation... consider nephrology referral           Relevant Orders    Referral to Nephrology    Iron deficiency anemia     Continue  with  planned  iromn transfusion  .. Per hematology           Other Visit Diagnoses       Screening for colon cancer        Relevant Orders    Colonoscopy Screening; High Risk Patient    Breast cancer screening by mammogram        Relevant Orders    BI mammo bilateral screening tomosynthesis    Anemia, unspecified type        Relevant Orders    Esophagogastroduodenoscopy (EGD)    Osteoporosis, unspecified osteoporosis type, unspecified pathological fracture presence        Relevant Medications    alendronate (Fosamax) 70 mg tablet    Other Relevant Orders    XR DEXA bone density                       VTE ppx-Patient is on lovenox for DVT  1/26 Lovenox on HOLD for paracentesis on 1/27 1/28 Resumed Lovenox 24 hrs after para

## 2025-02-03 ENCOUNTER — HOSPITAL ENCOUNTER (OUTPATIENT)
Dept: MRI IMAGING | Age: 70
Discharge: HOME | End: 2025-02-05
Payer: MEDICARE

## 2025-02-03 DIAGNOSIS — C78.02 SECONDARY MALIGNANT NEOPLASM OF LEFT LUNG (HCC): ICD-10-CM

## 2025-02-03 DIAGNOSIS — C34.11 MALIGNANT NEOPLASM OF UPPER LOBE OF RIGHT LUNG (HCC): ICD-10-CM

## 2025-02-03 LAB
PERFORMED ON: ABNORMAL
POC CREATININE: 1.4 MG/DL (ref 0.6–1.2)
POC SAMPLE TYPE: ABNORMAL

## 2025-02-03 PROCEDURE — A9577 INJ MULTIHANCE: HCPCS | Performed by: INTERNAL MEDICINE

## 2025-02-03 PROCEDURE — 6360000004 HC RX CONTRAST MEDICATION: Performed by: INTERNAL MEDICINE

## 2025-02-03 PROCEDURE — 70553 MRI BRAIN STEM W/O & W/DYE: CPT

## 2025-02-03 RX ADMIN — GADOBENATE DIMEGLUMINE 20 ML: 529 INJECTION, SOLUTION INTRAVENOUS at 16:01

## 2025-02-03 NOTE — TELEPHONE ENCOUNTER
PT CALLED IN TO THE OFFICE BECAUSE SHE WANTS TO START BACK ON CHANTIX.      PENDED RX      Rx requested:  Requested Prescriptions     Pending Prescriptions Disp Refills    Varenicline Tartrate, Starter, (CHANTIX STARTING MONTH PAK) 0.5 MG X 11 & 1 MG X 42 TBPK       Sig: Take by mouth       Last Office Visit:   1/28/2025      Next Visit Date:  Future Appointments   Date Time Provider Department Center   2/3/2025  3:00 PM [facility] ROOM 1 [facility]   2/10/2025 10:00 AM SCHEDULE, [facility] NURSE [facility]   4/28/2025  3:00 PM Fariba Waite MD [facility]

## 2025-02-04 RX ORDER — VARENICLINE TARTRATE 0.5 (11)-1
KIT ORAL
OUTPATIENT
Start: 2025-02-04

## 2025-02-04 RX ORDER — VARENICLINE TARTRATE 0.5 MG/1
.5-1 TABLET, FILM COATED ORAL SEE ADMIN INSTRUCTIONS
Qty: 57 TABLET | Refills: 0 | Status: SHIPPED | OUTPATIENT
Start: 2025-02-04

## 2025-02-06 ENCOUNTER — OFFICE VISIT (OUTPATIENT)
Dept: ORTHOPEDIC SURGERY | Facility: CLINIC | Age: 70
End: 2025-02-06
Payer: MEDICARE

## 2025-02-06 DIAGNOSIS — M70.61 GREATER TROCHANTERIC BURSITIS OF RIGHT HIP: Primary | ICD-10-CM

## 2025-02-06 PROCEDURE — 99213 OFFICE O/P EST LOW 20 MIN: CPT | Performed by: ORTHOPAEDIC SURGERY

## 2025-02-06 PROCEDURE — 2500000004 HC RX 250 GENERAL PHARMACY W/ HCPCS (ALT 636 FOR OP/ED): Performed by: ORTHOPAEDIC SURGERY

## 2025-02-06 PROCEDURE — 99213 OFFICE O/P EST LOW 20 MIN: CPT | Mod: 25 | Performed by: ORTHOPAEDIC SURGERY

## 2025-02-06 PROCEDURE — 20610 DRAIN/INJ JOINT/BURSA W/O US: CPT | Mod: RT | Performed by: ORTHOPAEDIC SURGERY

## 2025-02-06 RX ORDER — LIDOCAINE HYDROCHLORIDE 10 MG/ML
5 INJECTION, SOLUTION INFILTRATION; PERINEURAL
Status: COMPLETED | OUTPATIENT
Start: 2025-02-06 | End: 2025-02-06

## 2025-02-06 RX ORDER — BETAMETHASONE SODIUM PHOSPHATE AND BETAMETHASONE ACETATE 3; 3 MG/ML; MG/ML
2 INJECTION, SUSPENSION INTRA-ARTICULAR; INTRALESIONAL; INTRAMUSCULAR; SOFT TISSUE
Status: COMPLETED | OUTPATIENT
Start: 2025-02-06 | End: 2025-02-06

## 2025-02-06 RX ADMIN — BETAMETHASONE SODIUM PHOSPHATE AND BETAMETHASONE ACETATE 2 ML: 3; 3 INJECTION, SUSPENSION INTRA-ARTICULAR; INTRALESIONAL; INTRAMUSCULAR at 16:12

## 2025-02-06 RX ADMIN — LIDOCAINE HYDROCHLORIDE 5 ML: 10 INJECTION, SOLUTION INFILTRATION; PERINEURAL at 16:12

## 2025-02-06 NOTE — PROGRESS NOTES
History of present illness: History right hip trochanteric bursitis did well with injections for 10 months here today with recurrent hip lateral trochanteric bursitis    Physical exam:    General: No acute distress or breathing difficulty or discomfort, pleasant and cooperative with the examination.    Extremities: The affected left hip was examined and inspected.  There was tenderness to touch along the groin and over the lateral aspect of the hip over the bursal area.  Hip joint moves freely.    There was no pain over the groin area to internal/external rotation abduction.    Palpable reproducible pain was reproduced with palpation over the lateral trochanteric process.  There was a tight IT band with a positive Liam sign.      The skin was intact without breakdown or open wound.  Old incisions of present were all healed.  There was mild crepitus seen with internal and external rotation and range of motion without evidence of gross instability.    Inspection of the low back showed normal curvature integrity of the skin.  The strength and stability of the low back and ligaments were within normal limits.    There was a normal straight leg raise with no foot drop, numbness or tingling in the bilateral lower extremities.  Sensation, reflexes and pulses in the foot and ankle are preserved and present.  There was no obvious effusion.    Range of motion showed flexion to beyond 100 degrees degrees, abduction to 30 degrees, external rotation to 15 degrees and 18 degrees of internal rotation.  The patient had the ability to bear weight but with discomfort.  The patient's gait antalgic secondary to discomfort      Before aspiration injection the risks of a cortisone injection including infection, local skin irritation, skin atrophy, calcification, continued pain and discomfort, elevated blood sugar, burning, failure to relieve pain, possible late infection were discussed with the patient.    Postprocedure discomfort can  be alleviated with additional medications, ice, elevation, rest over the first 24 hours as recommended.    Patient verbalized understanding and wanted to proceed with the planned procedure.    After informed consent was provided and allergies verified, the patient was positioned appropriately on the  bed.  The right hip to be aspirated and injected was prepped and draped in a sterile fashion.  The skin was anesthetized with ethyl chloride spray.  A joint aspiration was to be performed an 18-gauge needle was used otherwise a 22-gauge needle was used to inject the appropriate joint.      Joint injection was performed with a mixture of 5 cc 1% lidocaine plain and 2 cc Celestone Soluspan 6 mg per mL.  The needle was removed and the puncture site closed and sealed with a Band-Aid.  The patient tolerated the procedure well.    Diagnostic studies: No new X    Impression: Right hip known trochanteric bursitis but did well with an injection for over 10 months    Plan: Injection today ice elevate PT therapy stretching IT band program follow-up as needed she will be due for new x-rays next year when she returns if her hip pain returns    Patient ID: Idania Orozco is a 69 y.o. female.    L Inj/Asp: R greater trochanteric bursa on 2/6/2025 4:12 PM  Indications: pain  Details: 22 G needle, anteromedial approach  Medications: 2 mL betamethasone acet,sod phos 6 mg/mL; 5 mL lidocaine 10 mg/mL (1 %)  Outcome: tolerated well, no immediate complications  Procedure, treatment alternatives, risks and benefits explained, specific risks discussed. Consent was given by the patient. Immediately prior to procedure a time out was called to verify the correct patient, procedure, equipment, support staff and site/side marked as required. Patient was prepped and draped in the usual sterile fashion.

## 2025-02-10 ENCOUNTER — HOSPITAL ENCOUNTER (OUTPATIENT)
Dept: RADIATION ONCOLOGY | Age: 70
Discharge: HOME | End: 2025-02-10
Payer: MEDICARE

## 2025-02-10 VITALS
WEIGHT: 148 LBS | SYSTOLIC BLOOD PRESSURE: 107 MMHG | OXYGEN SATURATION: 98 % | TEMPERATURE: 97.1 F | BODY MASS INDEX: 27.06 KG/M2 | HEART RATE: 73 BPM | RESPIRATION RATE: 18 BRPM | DIASTOLIC BLOOD PRESSURE: 73 MMHG

## 2025-02-10 DIAGNOSIS — C34.11 MALIGNANT NEOPLASM OF UPPER LOBE OF RIGHT LUNG (HCC): ICD-10-CM

## 2025-02-10 DIAGNOSIS — C34.81 SMALL CELL CARCINOMA OF OVERLAPPING SITES OF RIGHT LUNG (HCC): Primary | ICD-10-CM

## 2025-02-10 DIAGNOSIS — Z72.0 TOBACCO ABUSE: ICD-10-CM

## 2025-02-10 PROCEDURE — 99497 ADVNCD CARE PLAN 30 MIN: CPT | Performed by: RADIOLOGY

## 2025-02-10 PROCEDURE — 99212 OFFICE O/P EST SF 10 MIN: CPT | Performed by: RADIOLOGY

## 2025-02-10 PROCEDURE — 99205 OFFICE O/P NEW HI 60 MIN: CPT | Performed by: RADIOLOGY

## 2025-02-10 RX ORDER — NICOTINE 21 MG/24HR
1 PATCH, TRANSDERMAL 24 HOURS TRANSDERMAL EVERY 24 HOURS
Qty: 30 PATCH | Refills: 3 | Status: SHIPPED | OUTPATIENT
Start: 2025-02-10 | End: 2026-02-10

## 2025-02-10 NOTE — PROGRESS NOTES
SW contacted pt by telephone as she was seen for consultation in radiation oncology for treatment of lung cancer. During her consultation, pt endorsed a distress of 8/10, which she reports was related to her anxiety about reviewing MRI results today. She states that she did review the results with Dr. Fuller, and because they were more favorable, she is feeling much more relieved at present.     Pt reports she lives at home with her daughter and son-in-law. She states her daughter accompanied her to today's appointment, and pt described supportive relationships with each. Pt states that she does tend to drive herself, and she will plan to transport herself to treatment as needed.    Pt also requested information about Advance Directives, and plan was established that SW will provide her a packet when she comes in for treatment simulation on 2/13/2025. SW provided a brief explanation of the documents, and pt expressed awareness that she may contact SW should she wish assistance in completing them. SW business card included in packet.    Supportive services at the cancer center were discussed.  Pt was invited to contact SW should she have further question or concern, to which she was agreeable.

## 2025-02-10 NOTE — PROGRESS NOTES
NURSING ASSESSMENT     Date: 2/10/2025        Patient Name: Shaylee Jordan     YOB: 1955      Age:  69 y.o.      MRN: [id number]       Chaperone [x] Yes   [] No  daughter-Dottie    Advance Directives:   Do you currently have completed advance directives (living will)? [] Yes   [x] No         *If yes, please bring us a copy for your records.  *If no, would you like info or assistance in completing advance directives (living will)?   [x] Yes   [] No    Pain Score:   Pain Score (1-10): none  Pain Location: n/a  Pain Duration: n/a   Pain Management/Control: n/a      Is pain affecting your ability to take care of yourself or move throughout your home?                        [] Yes   [x] No    General: Fatigue off and on  Patient has gained weight [] Yes   [x] No  Patient has lost weight [] Yes   [x] No  How much weight in pounds and over what length of time:     Eyes (Ophthalmic): No Problem     Skin (Dermatological): No Problems     ENT: No Problems     Respiratory: MTZ with stairs, heavy lifting, long distances, continues to smoke 1 ppd, cough dry smokers cough. History of lung ca 2018  with resection and chemo and immounotherapy that finished late 2019     Cardiovascular: No Problems      Device   [] Yes   [x] No   Copy of Card Obtained [] Yes   [x] No    Gastrointestinal: Nausea on occasion lately takes compazine on occasion, occasion diarrhea, has ulcerative colitis    Genito-Urinary: No Problems     Breast: no problems     Musculoskeletal: Joint Pain hips and knees are arthritic, gets steroid injections about once a year in right knee    Neurological: No Problems      Hematological and Lymphatic: Easy Bruising with aspirin     Endocrine: No Problems     Gyn History:   /Para: /  Age of Menarche:   Age of Menopause 50's  Vaginal Bleeding:  none  First Pregnancy: 30  Breast Feeding:  no  Last Menstrual period:   Oral Contraceptives: yes     Number of years: 10 yrs  Hormone

## 2025-02-13 ENCOUNTER — HOSPITAL ENCOUNTER (OUTPATIENT)
Dept: RADIATION ONCOLOGY | Age: 70
Discharge: HOME | End: 2025-02-13
Payer: MEDICARE

## 2025-02-13 DIAGNOSIS — C34.11 MALIGNANT NEOPLASM OF UPPER LOBE OF RIGHT LUNG (HCC): Primary | ICD-10-CM

## 2025-02-13 PROCEDURE — 77334 RADIATION TREATMENT AID(S): CPT | Performed by: RADIOLOGY

## 2025-02-13 NOTE — PROGRESS NOTES
Teaching & Instructions - Chest  General  Simulation  Initial Treatment  Self-Care Info  Nutrition  Social Service    Site-Specific  Side Effects  Cough Mgmt  Esophagitis/Pharyngitis  Fatigue Mgmt  Pain Mgmt  SOB Mgmt  Skin Care    Educational Handouts  Radiation Therapy & You  Site Specific Instructions  Radiation Oncology Dept Information  CBMS Program    Patient eager to learn, verbalized understanding of verbal education and handouts.

## 2025-02-15 PROBLEM — C34.81: Status: ACTIVE | Noted: 2025-02-15

## 2025-02-15 NOTE — CONSULTS
MCG/ACT AEPB inhaler Inhale 1 puff into the lungs daily      traZODone (DESYREL) 100 MG tablet Take 1 tablet by mouth nightly at bedtime.      DULoxetine (CYMBALTA) 60 MG extended release capsule Take 1 capsule by mouth daily      Multiple Vitamins-Minerals (MULTIVITAMIN ADULTS 50+) TABS Take 1 tablet by mouth daily      simvastatin (ZOCOR) 40 MG tablet Take 1 tablet by mouth nightly      lisinopril-hydrochlorothiazide (PRINZIDE) 20-12.5 MG per tablet Take 1 tablet by mouth daily 30 tablet 0    ALPRAZolam (XANAX) 0.5 MG tablet Take one tablet PO four times daily  tablet 2    albuterol (PROAIR HFA) 108 (90 BASE) MCG/ACT inhaler Inhale 2 puffs into the lungs every 4 hours as needed for Wheezing 3 Inhaler 3    cetirizine (ZYRTEC) 10 MG tablet Take 1 tablet by mouth daily      aspirin 81 MG EC tablet Take 1 tablet by mouth daily      varenicline (CHANTIX) 0.5 MG tablet Take 1-2 tablets by mouth See Admin Instructions 0.5mg DAILY for 3 days followed by 0.5mg TWICE DAILY for 4 days followed by 1mg TWICE DAILY (Patient not taking: Reported on 2/10/2025) 57 tablet 0    albuterol (PROVENTIL) (5 MG/ML) 0.5% nebulizer solution Take 1 mL by nebulization 4 times daily as needed for Wheezing (Patient not taking: Reported on 2/10/2025) 120 each 3     No current facility-administered medications for this encounter.     Facility-Administered Medications Ordered in Other Encounters   Medication Dose Route Frequency Provider Last Rate Last Admin    cyanocobalamin injection 1,000 mcg  1,000 mcg IntraMUSCular Once Lucille Masterson MD           FAMILY HISTORY:  Family History   Problem Relation Age of Onset    Stroke Father     Stroke Brother     Stroke Brother     Heart Disease Brother         3 open heart surgeries    Cancer Mother         ovarian    Cancer Sister         breast       SOCIAL HISTORY:       reports that she has been smoking cigarettes. She has a 30 pack-year smoking history. She has never used smokeless

## 2025-02-16 PROBLEM — R52 PAIN: Status: RESOLVED | Noted: 2025-01-17 | Resolved: 2025-02-16

## 2025-02-17 ENCOUNTER — TELEPHONE (OUTPATIENT)
Dept: PRIMARY CARE | Facility: CLINIC | Age: 70
End: 2025-02-17
Payer: MEDICARE

## 2025-02-17 NOTE — TELEPHONE ENCOUNTER
Pt called in and and let us know that she recently received a Dx of lung cancer. She just wanted to let Dr. Vasquez know and also let him know that the office notes will be send to him on her next visit.

## 2025-02-18 ENCOUNTER — TELEPHONE (OUTPATIENT)
Dept: PALLATIVE CARE | Age: 70
End: 2025-02-18

## 2025-02-20 ENCOUNTER — APPOINTMENT (OUTPATIENT)
Dept: UROLOGY | Facility: CLINIC | Age: 70
End: 2025-02-20
Payer: MEDICARE

## 2025-02-21 ENCOUNTER — OFFICE VISIT (OUTPATIENT)
Dept: PALLATIVE CARE | Age: 70
End: 2025-02-21
Payer: OTHER GOVERNMENT

## 2025-02-21 VITALS — HEART RATE: 74 BPM | OXYGEN SATURATION: 98 % | TEMPERATURE: 97.8 F

## 2025-02-21 DIAGNOSIS — J44.9 CHRONIC OBSTRUCTIVE PULMONARY DISEASE, UNSPECIFIED COPD TYPE (HCC): ICD-10-CM

## 2025-02-21 DIAGNOSIS — Z51.5 PALLIATIVE CARE ENCOUNTER: ICD-10-CM

## 2025-02-21 DIAGNOSIS — C34.81 SMALL CELL CARCINOMA OF OVERLAPPING SITES OF RIGHT LUNG (HCC): Primary | ICD-10-CM

## 2025-02-21 DIAGNOSIS — Z71.89 ACP (ADVANCE CARE PLANNING): ICD-10-CM

## 2025-02-21 DIAGNOSIS — F41.8 DEPRESSION WITH ANXIETY: ICD-10-CM

## 2025-02-21 PROCEDURE — G8427 DOCREV CUR MEDS BY ELIG CLIN: HCPCS | Performed by: NURSE PRACTITIONER

## 2025-02-21 PROCEDURE — 99204 OFFICE O/P NEW MOD 45 MIN: CPT | Performed by: NURSE PRACTITIONER

## 2025-02-21 PROCEDURE — 3023F SPIROM DOC REV: CPT | Performed by: NURSE PRACTITIONER

## 2025-02-21 PROCEDURE — G8417 CALC BMI ABV UP PARAM F/U: HCPCS | Performed by: NURSE PRACTITIONER

## 2025-02-21 PROCEDURE — 1123F ACP DISCUSS/DSCN MKR DOCD: CPT | Performed by: NURSE PRACTITIONER

## 2025-02-21 PROCEDURE — 1090F PRES/ABSN URINE INCON ASSESS: CPT | Performed by: NURSE PRACTITIONER

## 2025-02-21 PROCEDURE — 4004F PT TOBACCO SCREEN RCVD TLK: CPT | Performed by: NURSE PRACTITIONER

## 2025-02-21 PROCEDURE — 3017F COLORECTAL CA SCREEN DOC REV: CPT | Performed by: NURSE PRACTITIONER

## 2025-02-21 PROCEDURE — G8400 PT W/DXA NO RESULTS DOC: HCPCS | Performed by: NURSE PRACTITIONER

## 2025-02-21 RX ORDER — TOPIRAMATE 25 MG/1
25 TABLET, FILM COATED ORAL 3 TIMES DAILY
COMMUNITY
Start: 2025-02-13

## 2025-02-21 NOTE — PROGRESS NOTES
Subjective:      Patient Id: Seen Shaylee at the clinic at the  [facility] , for initial palliative medicine consult for symptom management, advance care planning, and goals of care discussion.  She was accompanied to the appointment by: self.    Chief Complaint   Patient presents with    Establish Care      HPI       Shaylee Jordan is a 69 y.o. female referred to palliative care by  for small cell carcinoma of overlapping sites of right lung. Shaylee has complex medical history that includes lung cancer in 2018, COPD, tobacco abuse, CKD, HTN, NSCLC of right lung s/p resection and chemo-immunotherapy.     General: Patient is alert, oriented x 4, in NAD. Well-appearing.    SCC of right lung: Confirmed via bx. In January of 2025. Plan is for chemo/radiation therapy.   History of Present Illness  Plan is to start chemotherapy and radiation therapy. Cancer found incidentally.     Her weight has remained stable for the past 3.5 years, and she maintains a good appetite. She reports no pain, chest discomfort, or leg swelling. She also reports no bowel or bladder issues.     She reports no respiratory distress but does experience shortness of breath which is at baseline. She has been smoking for 50 years and is currently on Chantix, having reduced her daily cigarette intake significantly. She has a history of chronic obstructive pulmonary disease (COPD). She uses ProAir as needed and has a nebulizer at home. She also uses Trelegy Ellipta inhaler.    She is currently on Xanax and duloxetine for depression and anxiety, prescribed by Dr. Carpio.     She is currently in the process of setting up a healthcare power of , with her daughter as the designated agent. She is a full code and is agreeable to CPR and intubation.            Past Medical History:   Diagnosis Date    Allergic rhinitis     Anxiety     Cancer (HCC)     lung    Chronic kidney disease     Chronic knee pain, states needs knee

## 2025-02-24 ENCOUNTER — APPOINTMENT (OUTPATIENT)
Dept: RADIATION ONCOLOGY | Age: 70
End: 2025-02-24
Payer: MEDICARE

## 2025-02-25 ENCOUNTER — APPOINTMENT (OUTPATIENT)
Dept: RADIATION ONCOLOGY | Age: 70
End: 2025-02-25
Payer: MEDICARE

## 2025-02-26 ENCOUNTER — APPOINTMENT (OUTPATIENT)
Dept: RADIATION ONCOLOGY | Age: 70
End: 2025-02-26
Payer: MEDICARE

## 2025-02-27 ENCOUNTER — APPOINTMENT (OUTPATIENT)
Dept: RADIATION ONCOLOGY | Age: 70
End: 2025-02-27
Payer: MEDICARE

## 2025-02-28 ENCOUNTER — APPOINTMENT (OUTPATIENT)
Dept: RADIATION ONCOLOGY | Age: 70
End: 2025-02-28
Payer: MEDICARE

## 2025-02-28 ASSESSMENT — ENCOUNTER SYMPTOMS
TROUBLE SWALLOWING: 0
SHORTNESS OF BREATH: 1
GASTROINTESTINAL NEGATIVE: 1

## 2025-03-03 ENCOUNTER — APPOINTMENT (OUTPATIENT)
Dept: RADIATION ONCOLOGY | Age: 70
End: 2025-03-03
Payer: MEDICARE

## 2025-03-04 ENCOUNTER — HOSPITAL ENCOUNTER (OUTPATIENT)
Dept: RADIATION ONCOLOGY | Age: 70
Discharge: HOME | End: 2025-03-04
Payer: MEDICARE

## 2025-03-05 ENCOUNTER — HOSPITAL ENCOUNTER (OUTPATIENT)
Dept: RADIATION ONCOLOGY | Age: 70
Discharge: HOME | End: 2025-03-05
Payer: MEDICARE

## 2025-03-05 VITALS
WEIGHT: 157.4 LBS | DIASTOLIC BLOOD PRESSURE: 54 MMHG | HEART RATE: 93 BPM | SYSTOLIC BLOOD PRESSURE: 113 MMHG | RESPIRATION RATE: 18 BRPM | BODY MASS INDEX: 28.78 KG/M2 | TEMPERATURE: 97.8 F | OXYGEN SATURATION: 95 %

## 2025-03-05 DIAGNOSIS — C34.11 MALIGNANT NEOPLASM OF UPPER LOBE OF RIGHT LUNG (HCC): Primary | ICD-10-CM

## 2025-03-05 DIAGNOSIS — C34.81 SMALL CELL CARCINOMA OF OVERLAPPING SITES OF RIGHT LUNG (HCC): ICD-10-CM

## 2025-03-05 DIAGNOSIS — Z72.0 TOBACCO ABUSE: ICD-10-CM

## 2025-03-05 PROCEDURE — 77386 HC NTSTY MODUL RAD TX DLVR CPLX: CPT | Performed by: RADIOLOGY

## 2025-03-05 NOTE — PROGRESS NOTES
Encounters   Medication Dose Route Frequency Provider Last Rate Last Admin    cyanocobalamin injection 1,000 mcg  1,000 mcg IntraMUSCular Once Lucille Masterson MD         * New    PHYSICAL EXAM:       ECO - Symptomatic but completely ambulatory (Restricted in physically strenuous activity but ambulatory and able to carry out work of a light or sedentary nature. For example, light housework, office work)     General: NAD, AO x 3, Mentation is clear with appropriate affect.  HEENT: Normocephalic, atraumatic  Thorax:  Unlabored  Abdomen:  Non-distended    Chemotherapy Update: Concurrent chemotherapy    Treatment Imaging: CBCT    ASSESSMENT: No significant radiation side effects. Responding appropriately to symptomatic management.    New medications, diagnostic results: Continue treatment as planned    PLAN: Again reviewed potential side effects of radiation for the patient's treatment.    Continue local/topical care.  She has a mild sore throat that is unlikely to be related to radiation therapy as she is just tired.  I did advise her to start doing salt water baking soda rinses/gargles 3-4 times daily.  We will continue to follow this.   Continue current radiation course as prescribed.

## 2025-03-06 ENCOUNTER — APPOINTMENT (OUTPATIENT)
Dept: RADIOLOGY | Facility: HOSPITAL | Age: 70
End: 2025-03-06
Payer: MEDICARE

## 2025-03-06 ENCOUNTER — HOSPITAL ENCOUNTER (OUTPATIENT)
Dept: RADIATION ONCOLOGY | Age: 70
Discharge: HOME | End: 2025-03-06
Payer: MEDICARE

## 2025-03-06 PROCEDURE — 77386 HC NTSTY MODUL RAD TX DLVR CPLX: CPT | Performed by: RADIOLOGY

## 2025-03-07 ENCOUNTER — HOSPITAL ENCOUNTER (OUTPATIENT)
Dept: RADIATION ONCOLOGY | Age: 70
Discharge: HOME | End: 2025-03-07
Payer: MEDICARE

## 2025-03-07 PROCEDURE — 77386 HC NTSTY MODUL RAD TX DLVR CPLX: CPT | Performed by: RADIOLOGY

## 2025-03-10 ENCOUNTER — HOSPITAL ENCOUNTER (OUTPATIENT)
Dept: RADIATION ONCOLOGY | Age: 70
Discharge: HOME | End: 2025-03-10
Payer: MEDICARE

## 2025-03-10 PROCEDURE — 77014 CHG CT GUIDANCE RADIATION THERAPY FLDS PLACEMENT: CPT | Performed by: RADIOLOGY

## 2025-03-10 PROCEDURE — 77386 HC NTSTY MODUL RAD TX DLVR CPLX: CPT | Performed by: RADIOLOGY

## 2025-03-11 ENCOUNTER — HOSPITAL ENCOUNTER (OUTPATIENT)
Dept: RADIATION ONCOLOGY | Age: 70
Discharge: HOME | End: 2025-03-11
Payer: MEDICARE

## 2025-03-11 PROCEDURE — 77386 HC NTSTY MODUL RAD TX DLVR CPLX: CPT | Performed by: RADIOLOGY

## 2025-03-11 PROCEDURE — 77014 CHG CT GUIDANCE RADIATION THERAPY FLDS PLACEMENT: CPT | Performed by: RADIOLOGY

## 2025-03-12 ENCOUNTER — HOSPITAL ENCOUNTER (OUTPATIENT)
Dept: RADIATION ONCOLOGY | Age: 70
Discharge: HOME | End: 2025-03-12
Payer: MEDICARE

## 2025-03-12 VITALS
DIASTOLIC BLOOD PRESSURE: 70 MMHG | TEMPERATURE: 97.8 F | OXYGEN SATURATION: 97 % | SYSTOLIC BLOOD PRESSURE: 116 MMHG | HEART RATE: 88 BPM | BODY MASS INDEX: 27.03 KG/M2 | WEIGHT: 147.8 LBS | RESPIRATION RATE: 18 BRPM

## 2025-03-12 DIAGNOSIS — C34.11 MALIGNANT NEOPLASM OF UPPER LOBE OF RIGHT LUNG (HCC): Primary | ICD-10-CM

## 2025-03-12 PROCEDURE — 77386 HC NTSTY MODUL RAD TX DLVR CPLX: CPT | Performed by: RADIOLOGY

## 2025-03-12 RX ORDER — ONDANSETRON 8 MG/1
8 TABLET, ORALLY DISINTEGRATING ORAL EVERY 8 HOURS PRN
Qty: 20 TABLET | Refills: 1 | Status: SHIPPED | OUTPATIENT
Start: 2025-03-12

## 2025-03-12 NOTE — PROGRESS NOTES
[facility]           Radiation Oncology      [address] [id number]        O: [phone]        F: [phone]       [facility]                   Dr. Asad Fuller MD PhD    ON TREATMENT VISIT (OTV) NOTE     Date of Service: 3/12/2025  Patient ID: Shaylee Jordan   : 1955  MRN: [id number]   Acct Number: [id number]     DIAGNOSIS:   Cancer Staging   Malignant neoplasm of upper lobe of right lung (HCC)  Staging form: Lung, AJCC 8th Edition  - Clinical: Stage IIIA (cT1c, cN2, cM0) - Signed by Asad Fuller MD on 2/15/2025      Treatment Area: Thoracic    Current Total Dose(cGy): 1400  Current Fraction: 7    Patient was seen today for weekly visit.     Wt Readings from Last 3 Encounters:   25 67 kg (147 lb 12.8 oz)   25 71.4 kg (157 lb 6.4 oz)   02/10/25 67.1 kg (148 lb)       /70   Pulse 88   Temp 97.8 °F (36.6 °C) (Temporal)   Resp 18   Wt 67 kg (147 lb 12.8 oz)   LMP 2007   SpO2 97%   BMI 27.03 kg/m²     Lab Results   Component Value Date    WBC 6.0 07/10/2019    HGB 11.2 (A) 07/10/2019    HCT 33.9 (A) 07/10/2019     07/10/2019       Comfort Alteration  Fatigue: Report slight increase in fatigue    Pain Location: soreness to right side of throat, does not describe as pain.   Pain Intensity (Current): 0 No Pain  Pain Treatment: N/A  Pain Relief: n/a    Emotional Alteration:   Coping: effective    Nutritional Alteration  Anorexia: none   Nausea: Reports isolated incident of nausea with diarrhea last Friday. States no further episodes   Vomiting: see above.   Dyspepsia/Heartburn: None  Dysphagia/Esophagitis: Mild dysphagia, but can eat regular diet. Discussed Magic Mouth Wash. Patient hasn't started it yet.     Skin Alteration   Skin reaction: No changes noted, using Eucerin once daily. Encouraged to increase to twice daily.  Alopecia: No loss    Mucous Membrane Alteration  Mucositis XRT Related: None  Pharynx and

## 2025-03-13 ENCOUNTER — HOSPITAL ENCOUNTER (OUTPATIENT)
Dept: RADIATION ONCOLOGY | Age: 70
Discharge: HOME | End: 2025-03-13
Payer: MEDICARE

## 2025-03-13 PROCEDURE — 77386 HC NTSTY MODUL RAD TX DLVR CPLX: CPT | Performed by: RADIOLOGY

## 2025-03-14 ENCOUNTER — HOSPITAL ENCOUNTER (OUTPATIENT)
Dept: RADIATION ONCOLOGY | Age: 70
Discharge: HOME | End: 2025-03-14
Payer: MEDICARE

## 2025-03-14 PROCEDURE — 77386 HC NTSTY MODUL RAD TX DLVR CPLX: CPT | Performed by: RADIOLOGY

## 2025-03-17 ENCOUNTER — HOSPITAL ENCOUNTER (OUTPATIENT)
Dept: RADIATION ONCOLOGY | Age: 70
Discharge: HOME | End: 2025-03-17
Payer: MEDICARE

## 2025-03-17 PROCEDURE — 77014 CHG CT GUIDANCE RADIATION THERAPY FLDS PLACEMENT: CPT | Performed by: RADIOLOGY

## 2025-03-17 PROCEDURE — 77386 HC NTSTY MODUL RAD TX DLVR CPLX: CPT | Performed by: RADIOLOGY

## 2025-03-18 ENCOUNTER — HOSPITAL ENCOUNTER (OUTPATIENT)
Dept: RADIATION ONCOLOGY | Age: 70
Discharge: HOME | End: 2025-03-18
Payer: MEDICARE

## 2025-03-18 ENCOUNTER — APPOINTMENT (OUTPATIENT)
Dept: RADIATION ONCOLOGY | Age: 70
End: 2025-03-18
Payer: MEDICARE

## 2025-03-18 PROCEDURE — 77386 HC NTSTY MODUL RAD TX DLVR CPLX: CPT | Performed by: RADIOLOGY

## 2025-03-19 ENCOUNTER — HOSPITAL ENCOUNTER (OUTPATIENT)
Dept: RADIATION ONCOLOGY | Age: 70
Discharge: HOME | End: 2025-03-19
Payer: MEDICARE

## 2025-03-19 VITALS
WEIGHT: 144.6 LBS | RESPIRATION RATE: 16 BRPM | SYSTOLIC BLOOD PRESSURE: 138 MMHG | TEMPERATURE: 96.9 F | DIASTOLIC BLOOD PRESSURE: 77 MMHG | HEART RATE: 102 BPM | OXYGEN SATURATION: 99 % | BODY MASS INDEX: 26.44 KG/M2

## 2025-03-19 DIAGNOSIS — C34.11 MALIGNANT NEOPLASM OF UPPER LOBE OF RIGHT LUNG (HCC): Primary | ICD-10-CM

## 2025-03-19 DIAGNOSIS — C34.81 SMALL CELL CARCINOMA OF OVERLAPPING SITES OF RIGHT LUNG: ICD-10-CM

## 2025-03-19 PROCEDURE — 77386 HC NTSTY MODUL RAD TX DLVR CPLX: CPT | Performed by: RADIOLOGY

## 2025-03-19 PROCEDURE — 77014 CHG CT GUIDANCE RADIATION THERAPY FLDS PLACEMENT: CPT | Performed by: RADIOLOGY

## 2025-03-19 NOTE — PROGRESS NOTES
[facility]           Radiation Oncology      [id number] [place], [place] [address]        O: [phone]        F: [phone]       [facility]                   Dr. Asad Fuller MD PhD    ON TREATMENT VISIT (OTV) NOTE     Date of Service: 3/19/2025  Patient ID: Shaylee Jordan   : 1955  MRN: [id number]   Acct Number: [id number]     DIAGNOSIS:   Cancer Staging   Malignant neoplasm of upper lobe of right lung (HCC)  Staging form: Lung, AJCC 8th Edition  - Clinical: Stage IIIA (cT1c, cN2, cM0) - Signed by Asad Fuller MD on 2/15/2025      Treatment Area: Thoracic    Current Total Dose(cGy): 2400 cGy  Current Fraction: 12    Patient was seen today for weekly visit.     Wt Readings from Last 3 Encounters:   25 65.6 kg (144 lb 9.6 oz)   25 67 kg (147 lb 12.8 oz)   25 71.4 kg (157 lb 6.4 oz)       /77   Pulse (!) 102   Temp 96.9 °F (36.1 °C)   Resp 16   Wt 65.6 kg (144 lb 9.6 oz)   LMP 2007   SpO2 99%   BMI 26.44 kg/m²     Lab Results   Component Value Date    WBC 6.0 07/10/2019    HGB 11.2 (A) 07/10/2019    HCT 33.9 (A) 07/10/2019     07/10/2019       Comfort Alteration  Fatigue: Moderate fatigue that started this past Monday, napping or resting more.    Pain Location: Denies and sore throat since the weekend. Took MMW only twice.  Pain Intensity (Current): 0 No Pain  Pain Treatment: N/A  Pain Relief: n/a    Emotional Alteration:   Coping: effective    Nutritional Alteration  Anorexia:  Noticed appetite loss over the week, but still eating good.  Nausea: Mild to moderate vomiting today and yesterday. Takes Zofran as needed with good relief yesterday, but not as good today.  Vomiting: No vomiting   Dyspepsia/Heartburn: None  Dysphagia/Esophagitis: None-Denies any sore throat since over the weekend    Skin Alteration   Skin reaction: No changes noted- using Eucerin twice daily  Alopecia: No loss    Mucous Membrane  tablets by mouth See Admin Instructions 0.5mg DAILY for 3 days followed by 0.5mg TWICE DAILY for 4 days followed by 1mg TWICE DAILY (Patient not taking: Reported on 3/5/2025) 57 tablet 0    albuterol (PROVENTIL) (5 MG/ML) 0.5% nebulizer solution Take 1 mL by nebulization 4 times daily as needed for Wheezing (Patient not taking: Reported on 2025) 120 each 3    cetirizine (ZYRTEC) 10 MG tablet Take 1 tablet by mouth daily as needed for Allergies (Patient not taking: Reported on 3/19/2025)       No current facility-administered medications for this encounter.     Facility-Administered Medications Ordered in Other Encounters   Medication Dose Route Frequency Provider Last Rate Last Admin    cyanocobalamin injection 1,000 mcg  1,000 mcg IntraMUSCular Once Lucille Masterson MD         * New    PHYSICAL EXAM:       {ECOGPFS:73104::\"ECO - Asymptomatic (Fully active, able to carry on all pre-disease activities without restriction)\"}     {RTPEOTV:41334}    Chemotherapy Update: {EOTSysTx:00561}    Treatment Imaging: {OTVRTIM}    ASSESSMENT: {RTFXOTV:69675} radiation side effects. Responding appropriately to symptomatic management.    New medications, diagnostic results: {RTOTVCHANGES:48147}    PLAN: Again reviewed potential side effects of radiation for the patient's treatment.    Continue local/topical care. ***   Continue current radiation course as prescribed.

## 2025-03-20 ENCOUNTER — HOSPITAL ENCOUNTER (OUTPATIENT)
Dept: RADIOLOGY | Facility: HOSPITAL | Age: 70
Discharge: HOME | End: 2025-03-20
Payer: MEDICARE

## 2025-03-20 ENCOUNTER — HOSPITAL ENCOUNTER (OUTPATIENT)
Dept: RADIATION ONCOLOGY | Age: 70
Discharge: HOME | End: 2025-03-20
Payer: MEDICARE

## 2025-03-20 DIAGNOSIS — Q61.8 OTHER CYSTIC KIDNEY DISEASES: ICD-10-CM

## 2025-03-20 PROCEDURE — 76770 US EXAM ABDO BACK WALL COMP: CPT

## 2025-03-20 PROCEDURE — 77336 RADIATION PHYSICS CONSULT: CPT | Performed by: RADIOLOGY

## 2025-03-20 PROCEDURE — 77386 HC NTSTY MODUL RAD TX DLVR CPLX: CPT | Performed by: RADIOLOGY

## 2025-03-21 ENCOUNTER — HOSPITAL ENCOUNTER (OUTPATIENT)
Dept: RADIATION ONCOLOGY | Age: 70
Discharge: HOME | End: 2025-03-21
Payer: MEDICARE

## 2025-03-21 PROCEDURE — 77386 HC NTSTY MODUL RAD TX DLVR CPLX: CPT | Performed by: RADIOLOGY

## 2025-03-24 ENCOUNTER — TELEPHONE (OUTPATIENT)
Dept: NEPHROLOGY | Facility: CLINIC | Age: 70
End: 2025-03-24
Payer: MEDICARE

## 2025-03-24 ENCOUNTER — HOSPITAL ENCOUNTER (OUTPATIENT)
Dept: RADIATION ONCOLOGY | Age: 70
Discharge: HOME | End: 2025-03-24
Payer: MEDICARE

## 2025-03-24 PROCEDURE — 77427 RADIATION TX MANAGEMENT X5: CPT | Performed by: RADIOLOGY

## 2025-03-25 ENCOUNTER — HOSPITAL ENCOUNTER (OUTPATIENT)
Dept: RADIATION ONCOLOGY | Age: 70
Discharge: HOME | End: 2025-03-25
Payer: MEDICARE

## 2025-03-25 DIAGNOSIS — C34.11 MALIGNANT NEOPLASM OF UPPER LOBE OF RIGHT LUNG (HCC): Primary | ICD-10-CM

## 2025-03-25 PROCEDURE — 77386 HC NTSTY MODUL RAD TX DLVR CPLX: CPT | Performed by: RADIOLOGY

## 2025-03-26 ENCOUNTER — HOSPITAL ENCOUNTER (OUTPATIENT)
Dept: RADIATION ONCOLOGY | Age: 70
Discharge: HOME | End: 2025-03-26
Payer: MEDICARE

## 2025-03-26 VITALS
BODY MASS INDEX: 28.2 KG/M2 | DIASTOLIC BLOOD PRESSURE: 69 MMHG | OXYGEN SATURATION: 98 % | SYSTOLIC BLOOD PRESSURE: 130 MMHG | TEMPERATURE: 97.4 F | HEART RATE: 96 BPM | RESPIRATION RATE: 16 BRPM | WEIGHT: 154.2 LBS

## 2025-03-26 DIAGNOSIS — C34.11 MALIGNANT NEOPLASM OF UPPER LOBE OF RIGHT LUNG (HCC): Primary | ICD-10-CM

## 2025-03-26 PROCEDURE — 77386 HC NTSTY MODUL RAD TX DLVR CPLX: CPT | Performed by: RADIOLOGY

## 2025-03-26 NOTE — PROGRESS NOTES
[facility]           Radiation Oncology      [address] [id number]        O: [phone]        F: [phone]       [facility]                   Dr. Asad Fuller MD PhD    ON TREATMENT VISIT (OTV) NOTE     Date of Service: 3/26/2025  Patient ID: Shaylee Jordan   : 1955  MRN: [id number]   Acct Number: [id number]     DIAGNOSIS:   Cancer Staging   Malignant neoplasm of upper lobe of right lung (HCC)  Staging form: Lung, AJCC 8th Edition  - Clinical: Stage IIIA (cT1c, cN2, cM0) - Signed by Asad Fuller MD on 2/15/2025      Treatment Area: Thoracic    Current Total Dose(cGy): 3400 cGy  Current Fraction: 17    Patient was seen today for weekly visit.     Wt Readings from Last 3 Encounters:   25 69.9 kg (154 lb 3.2 oz)   25 65.6 kg (144 lb 9.6 oz)   25 67 kg (147 lb 12.8 oz)       /69   Pulse 96   Temp 97.4 °F (36.3 °C)   Resp 16   Wt 69.9 kg (154 lb 3.2 oz)   LMP 2007   SpO2 98%   BMI 28.20 kg/m²     Lab Results   Component Value Date    WBC 6.0 07/10/2019    HGB 11.2 (A) 07/10/2019    HCT 33.9 (A) 07/10/2019     07/10/2019       Comfort Alteration  Fatigue:Ongoing moderate fatigue, no change from last week.    Pain Location: Denies  Pain Intensity (Current): 0 No Pain  Pain Treatment: N/A  Pain Relief: No relief    Emotional Alteration:   Coping: effective, but having some difficulty through chemo treatments.    Nutritional Alteration  Anorexia:  Continues to eat good, but food doesn't taste good.  Nausea:  Daily moderate nausea, using zofran 8 mg prn with relief.  Vomiting: No vomiting   Dyspepsia/Heartburn: None  Dysphagia/Esophagitis: None    Skin Alteration   Skin reaction: No changes noted-using Eucerin 1-2 times daily  Alopecia: Noticing some hair loss today.    Mucous Membrane Alteration  Mucositis XRT Related: None  Pharynx and Esophagus- Acute: No change over baseline  Voice Changes:

## 2025-03-26 NOTE — PROGRESS NOTES
Shaylee Jordan   [id number]  1955   Patient Mid-Point Distress Screening Form   Please select the number that describes how much distress you have experiened in the past week (0-10): 6    Please select the number that descrbes how much distress you have experienced today (0-10): 6    If you responded with a score of 6 or above to the first tow questions; please address the following concerns:    Practical Concerns 0-10 Comment        Work, Concerns about Job          Finances, Bills, Insurance          Housing          Transportation          Availability of Caregiver     Family Concerns          Dealing with Children          Dealing with Partner          Ability to have Children          Family Health Issues     Emotional Concerns          Sadness, Depression  Sadness related to health concerns and hair loss        Anxiety, Worry, Fear          Concerns about Appearance          Loss of Interest in Usual Activities     Spiritual Concerns          Connection to a higher power          Sense of meaning and purpose          Support for my spiritual community     Physical Concerns          Nausea          Eating, Loss of Appetite          Pain          Fatigue  Feels more tired lately with treatment     Other Concerns/Notes:  ALEX met with pt for Midpoint Assessment prior to radiation treatment today. She presents as a very pleasant woman, and she is candid that she experiences \"short periods of depression\" when she considers her illness, and she tends to rely on the \"very good support\" of her family and friends during these times. She also states that when she is depressed, she will depend on God and read her Bible as a source of comfort. SW provided emotional support. Pt confirmed that she does have Advance Directive documents, and she expressed she is aware she may seek SW assistance with completing them.  Pt invited to contact ALEX should she have further question or concern, to which she was agreeable.

## 2025-03-27 ENCOUNTER — HOSPITAL ENCOUNTER (OUTPATIENT)
Dept: RADIATION ONCOLOGY | Age: 70
Discharge: HOME | End: 2025-03-27
Payer: MEDICARE

## 2025-03-27 PROCEDURE — 77386 HC NTSTY MODUL RAD TX DLVR CPLX: CPT | Performed by: RADIOLOGY

## 2025-03-27 PROCEDURE — 77338 DESIGN MLC DEVICE FOR IMRT: CPT | Performed by: RADIOLOGY

## 2025-03-27 PROCEDURE — 77300 RADIATION THERAPY DOSE PLAN: CPT | Performed by: RADIOLOGY

## 2025-03-27 PROCEDURE — 77301 RADIOTHERAPY DOSE PLAN IMRT: CPT | Performed by: RADIOLOGY

## 2025-03-28 ENCOUNTER — APPOINTMENT (OUTPATIENT)
Dept: RADIATION ONCOLOGY | Age: 70
End: 2025-03-28
Payer: MEDICARE

## 2025-03-31 ENCOUNTER — HOSPITAL ENCOUNTER (OUTPATIENT)
Dept: RADIATION ONCOLOGY | Age: 70
Discharge: HOME | End: 2025-03-31
Payer: MEDICARE

## 2025-03-31 DIAGNOSIS — C34.11 MALIGNANT NEOPLASM OF UPPER LOBE OF RIGHT LUNG (HCC): Primary | ICD-10-CM

## 2025-03-31 PROCEDURE — 77386 HC NTSTY MODUL RAD TX DLVR CPLX: CPT | Performed by: RADIOLOGY

## 2025-03-31 PROCEDURE — 77014 CHG CT GUIDANCE RADIATION THERAPY FLDS PLACEMENT: CPT | Performed by: RADIOLOGY

## 2025-04-01 ENCOUNTER — APPOINTMENT (OUTPATIENT)
Dept: NEPHROLOGY | Facility: CLINIC | Age: 70
End: 2025-04-01
Payer: MEDICARE

## 2025-04-01 ENCOUNTER — HOSPITAL ENCOUNTER (OUTPATIENT)
Dept: RADIATION ONCOLOGY | Age: 70
Discharge: HOME | End: 2025-04-01
Payer: MEDICARE

## 2025-04-01 VITALS
WEIGHT: 151.8 LBS | HEART RATE: 91 BPM | BODY MASS INDEX: 27.94 KG/M2 | DIASTOLIC BLOOD PRESSURE: 82 MMHG | HEIGHT: 62 IN | SYSTOLIC BLOOD PRESSURE: 135 MMHG

## 2025-04-01 DIAGNOSIS — N28.1 RENAL CYST: ICD-10-CM

## 2025-04-01 DIAGNOSIS — I10 ESSENTIAL HYPERTENSION: Primary | ICD-10-CM

## 2025-04-01 DIAGNOSIS — N18.31 STAGE 3A CHRONIC KIDNEY DISEASE (MULTI): ICD-10-CM

## 2025-04-01 PROCEDURE — 77336 RADIATION PHYSICS CONSULT: CPT | Performed by: RADIOLOGY

## 2025-04-01 PROCEDURE — 3079F DIAST BP 80-89 MM HG: CPT | Performed by: INTERNAL MEDICINE

## 2025-04-01 PROCEDURE — 3008F BODY MASS INDEX DOCD: CPT | Performed by: INTERNAL MEDICINE

## 2025-04-01 PROCEDURE — 77386 HC NTSTY MODUL RAD TX DLVR CPLX: CPT | Performed by: RADIOLOGY

## 2025-04-01 PROCEDURE — 99213 OFFICE O/P EST LOW 20 MIN: CPT | Performed by: INTERNAL MEDICINE

## 2025-04-01 PROCEDURE — 3075F SYST BP GE 130 - 139MM HG: CPT | Performed by: INTERNAL MEDICINE

## 2025-04-01 PROCEDURE — 1159F MED LIST DOCD IN RCRD: CPT | Performed by: INTERNAL MEDICINE

## 2025-04-01 NOTE — PROGRESS NOTES
Idania CHRISTIAN Ernesto   69 y.o.    @@  N/Room: [id number]/Room/bed info not found    Subjective:   The patient is being seen for a routine clinic follow-up of chronic kidney disease. Recently, the disease has been stable. Disease complications:  No hyperkalemia, no hypocalcemia, no hyperphosphatemia, no metabolic acidosis, no coagulopathy, no uremic encephalopathy, no neuropathy and no renal osteodystrophy. The patient is currently asymptomatic. No associated symptoms are reported.       Meds:   Current Outpatient Medications   Medication Sig Dispense Refill    albuterol 2.5 mg /3 mL (0.083 %) nebulizer solution Take 3 mL (2.5 mg) by nebulization every 6 hours if needed for wheezing. 180 mL 3    ALPRAZolam (Xanax) 0.5 mg tablet Take 1 tablet (0.5 mg) by mouth 3 times a day.      aspirin 81 mg EC tablet Take 1 tablet (81 mg) by mouth once daily.      DULoxetine (Cymbalta) 60 mg DR capsule Take 1 capsule (60 mg) by mouth once daily. 1 po daily 90 capsule 3    fluticasone-umeclidin-vilanter (Trelegy Ellipta) 200-62.5-25 mcg blister with device 1 puff by continuous inhalation route once daily. Inhale 1 puffs daily 90 each 3    lisinopriL-hydrochlorothiazide 20-12.5 mg tablet Take 1 tablet by mouth once daily. 90 tablet 3    multivitamin tablet Take 1 tablet by mouth once daily.      simvastatin (Zocor) 40 mg tablet Take 1 tablet (40 mg) by mouth once daily at bedtime. 90 tablet 3    topiramate (Topamax) 25 mg tablet Take 1 tablet (25 mg) by mouth 2 times a day.      traZODone (Desyrel) 50 mg tablet Take 1 tablet (50 mg) by mouth once daily at bedtime.       No current facility-administered medications for this visit.          ROS:  The patient is awake and oriented. No dizziness or lightheadedness. No chills and no fever. No headaches. No nausea and no vomiting. No shortness of breath. No cough. No chest pain. No abdominal pain. No diarrhea. No hematemesis or hemoptysis. No hematuria. No rectal bleeding. No melena. No  epistaxis. No urinary symptoms. No flank pain. No leg edema. No itching. Overall, the rest of the review of systems is also negative.  12 point review of systems otherwise negative as stated in HPI.        Physical Examination:        Vitals:    04/01/25 1513   BP: 135/82   Pulse: 91     General: The patient is awake, oriented, and is not in any distress.  Head and Neck: Normocephalic. No periorbital edema.  Eyes: non-icteric  Respiratory: Symmetric chest expansion. No respiratory distress.  Skin: No maculopapular rash.  Musculoskeletal: No peripheral edema.  Neuro Exam: Speech is fluent. Moves extremities.    Imaging:  === 03/20/25 ===    US RENAL COMPLETE    - Impression -  Stable 1.3 cm mildly complex cyst within the left kidney. No  additional lesions are identified on today's examination.    MACRO:  None.    Signed by: Terrence Bartlett 3/21/2025 5:40 PM  Dictation workstation:   [id number]       Blood Labs:  No results found for this or any previous visit (from the past 24 hours).   Lab Results   Component Value Date    PTH 32.2 09/16/2024    PROTUR NEGATIVE 11/12/2024    PROTUR NEGATIVE 11/12/2024      Lab Results   Component Value Date    GLUCOSE 86 11/12/2024    CALCIUM 9.4 11/12/2024     11/12/2024    K 3.9 11/12/2024    CO2 25 11/12/2024     11/12/2024    BUN 29 (H) 11/12/2024    CREATININE 1.06 (H) 11/12/2024         Assessment and Plan:  Recently she was diagnosed with small cell lung cancer and she is getting radiation and chemotherapy.  I do not have any details about her chemo regimen.    #1 chronic kidney disease stage III.  Last creatinine level from November 2024 is 1.06.  I asked for a basic metabolic panel to be done today.    2.  Hypertension.  Blood pressure is acceptable.  Continue the current medications.     3.  Renal cyst.  She had a CAT scan in November 2024 which showed benign cysts. No more need for follow up.           I will see her in about 6 months for  follow-up.          Pepito Shetty MD  Senior Attending Physician  Director of Onco-Nephrology Program  Division of Nephrology & Hypertension  [facility]

## 2025-04-02 ENCOUNTER — HOSPITAL ENCOUNTER (OUTPATIENT)
Dept: RADIATION ONCOLOGY | Age: 70
Discharge: HOME | End: 2025-04-02
Payer: MEDICARE

## 2025-04-02 VITALS
RESPIRATION RATE: 18 BRPM | SYSTOLIC BLOOD PRESSURE: 142 MMHG | DIASTOLIC BLOOD PRESSURE: 79 MMHG | BODY MASS INDEX: 27.46 KG/M2 | OXYGEN SATURATION: 98 % | TEMPERATURE: 97 F | WEIGHT: 150.2 LBS | HEART RATE: 96 BPM

## 2025-04-02 DIAGNOSIS — C34.11 MALIGNANT NEOPLASM OF UPPER LOBE OF RIGHT LUNG (HCC): Primary | ICD-10-CM

## 2025-04-02 PROCEDURE — 77014 CHG CT GUIDANCE RADIATION THERAPY FLDS PLACEMENT: CPT | Performed by: RADIOLOGY

## 2025-04-02 PROCEDURE — 77386 HC NTSTY MODUL RAD TX DLVR CPLX: CPT | Performed by: RADIOLOGY

## 2025-04-02 RX ORDER — POLYETHYLENE GLYCOL 3350 17 G/17G
17 POWDER, FOR SOLUTION ORAL DAILY
COMMUNITY

## 2025-04-02 NOTE — PROGRESS NOTES
(MULTIVITAMIN ADULTS 50+) TABS Take 1 tablet by mouth daily (Patient not taking: Reported on 2025)      albuterol (PROVENTIL) (5 MG/ML) 0.5% nebulizer solution Take 1 mL by nebulization 4 times daily as needed for Wheezing (Patient not taking: Reported on 2025) 120 each 3    cetirizine (ZYRTEC) 10 MG tablet Take 1 tablet by mouth daily as needed for Allergies (Patient not taking: Reported on 2025)       No current facility-administered medications for this encounter.     Facility-Administered Medications Ordered in Other Encounters   Medication Dose Route Frequency Provider Last Rate Last Admin    cyanocobalamin injection 1,000 mcg  1,000 mcg IntraMUSCular Once Lucille Masterson MD         * New    PHYSICAL EXAM:       ECO - Symptomatic but completely ambulatory (Restricted in physically strenuous activity but ambulatory and able to carry out work of a light or sedentary nature. For example, light housework, office work)     General: NAD, AO x 3, Mentation is clear with appropriate affect.  HEENT: Normocephalic, atraumatic  Thorax:  Unlabored  Abdomen:  Non-distended    Chemotherapy Update: Concurrent chemotherapy    Treatment Imaging: CBCT    ASSESSMENT: Modest radiation side effects. Responding appropriately to symptomatic management.    New medications, diagnostic results: Continue treatment as planned    PLAN: Again reviewed potential side effects of radiation for the patient's treatment.    Continue local/topical care.  The patient has experience of nausea and emesis a few days after her most recent systemic therapy administration.  I did recommend that she continue her Zofran and we will reach out to the medical oncology team so that they are aware in the event they need to escalate her antiemetic therapy.  She is otherwise tolerating therapy well   Continue current radiation course as prescribed.

## 2025-04-03 ENCOUNTER — HOSPITAL ENCOUNTER (OUTPATIENT)
Dept: RADIATION ONCOLOGY | Age: 70
Discharge: HOME | End: 2025-04-03
Payer: MEDICARE

## 2025-04-03 PROCEDURE — 77386 HC NTSTY MODUL RAD TX DLVR CPLX: CPT | Performed by: RADIOLOGY

## 2025-04-04 ENCOUNTER — HOSPITAL ENCOUNTER (OUTPATIENT)
Dept: RADIATION ONCOLOGY | Age: 70
Discharge: HOME | End: 2025-04-04
Payer: MEDICARE

## 2025-04-04 PROCEDURE — 77386 HC NTSTY MODUL RAD TX DLVR CPLX: CPT | Performed by: RADIOLOGY

## 2025-04-07 ENCOUNTER — HOSPITAL ENCOUNTER (OUTPATIENT)
Dept: RADIATION ONCOLOGY | Age: 70
Discharge: HOME | End: 2025-04-07
Payer: MEDICARE

## 2025-04-08 ENCOUNTER — HOSPITAL ENCOUNTER (OUTPATIENT)
Dept: RADIATION ONCOLOGY | Age: 70
Discharge: HOME | End: 2025-04-08
Payer: MEDICARE

## 2025-04-08 PROCEDURE — 77336 RADIATION PHYSICS CONSULT: CPT | Performed by: RADIOLOGY

## 2025-04-08 PROCEDURE — 77386 HC NTSTY MODUL RAD TX DLVR CPLX: CPT | Performed by: RADIOLOGY

## 2025-04-08 PROCEDURE — 77014 CHG CT GUIDANCE RADIATION THERAPY FLDS PLACEMENT: CPT | Performed by: RADIOLOGY

## 2025-04-08 PROCEDURE — 77427 RADIATION TX MANAGEMENT X5: CPT | Performed by: RADIOLOGY

## 2025-04-09 ENCOUNTER — HOSPITAL ENCOUNTER (OUTPATIENT)
Dept: RADIATION ONCOLOGY | Age: 70
Discharge: HOME | End: 2025-04-09
Payer: MEDICARE

## 2025-04-09 VITALS
DIASTOLIC BLOOD PRESSURE: 61 MMHG | HEART RATE: 99 BPM | BODY MASS INDEX: 26.62 KG/M2 | OXYGEN SATURATION: 99 % | WEIGHT: 145.6 LBS | TEMPERATURE: 97.6 F | RESPIRATION RATE: 18 BRPM | SYSTOLIC BLOOD PRESSURE: 119 MMHG

## 2025-04-09 DIAGNOSIS — C34.11 MALIGNANT NEOPLASM OF UPPER LOBE OF RIGHT LUNG (HCC): Primary | ICD-10-CM

## 2025-04-09 PROCEDURE — 77386 HC NTSTY MODUL RAD TX DLVR CPLX: CPT | Performed by: RADIOLOGY

## 2025-04-09 NOTE — PROGRESS NOTES
Reported on 2025)      albuterol (PROVENTIL) (5 MG/ML) 0.5% nebulizer solution Take 1 mL by nebulization 4 times daily as needed for Wheezing (Patient not taking: Reported on 2025) 120 each 3     No current facility-administered medications for this encounter.     Facility-Administered Medications Ordered in Other Encounters   Medication Dose Route Frequency Provider Last Rate Last Admin    cyanocobalamin injection 1,000 mcg  1,000 mcg IntraMUSCular Once Lucille Masterson MD         * New    PHYSICAL EXAM:       ECO - Symptomatic but completely ambulatory (Restricted in physically strenuous activity but ambulatory and able to carry out work of a light or sedentary nature. For example, light housework, office work)     General: NAD, AO x 3, Mentation is clear with appropriate affect.  HEENT: Normocephalic, atraumatic  Thorax:  Unlabored  Abdomen:  Non-distended    Chemotherapy Update: Concurrent chemotherapy    Treatment Imaging: CBCT    ASSESSMENT: Modest radiation side effects. Responding appropriately to symptomatic management.    New medications, diagnostic results: Continue treatment as planned    PLAN: Again reviewed potential side effects of radiation for the patient's treatment.    Continue local/topical care.  The patient will be finishing up radiation therapy next week and I will see her back for a quick check in 4 weeks.  After that we will coordinate a restaging CT chest and repeat MRI of the brain in anticipation of prophylactic cranial radiation therapy which the patient is amenable to doing given history of small cell lung cancer.   Continue current radiation course as prescribed.

## 2025-04-10 ENCOUNTER — HOSPITAL ENCOUNTER (OUTPATIENT)
Dept: RADIATION ONCOLOGY | Age: 70
Discharge: HOME | End: 2025-04-10
Payer: MEDICARE

## 2025-04-10 PROCEDURE — 77386 HC NTSTY MODUL RAD TX DLVR CPLX: CPT | Performed by: RADIOLOGY

## 2025-04-11 ENCOUNTER — HOSPITAL ENCOUNTER (OUTPATIENT)
Dept: RADIATION ONCOLOGY | Age: 70
Discharge: HOME | End: 2025-04-11
Payer: MEDICARE

## 2025-04-11 PROCEDURE — 77386 HC NTSTY MODUL RAD TX DLVR CPLX: CPT | Performed by: RADIOLOGY

## 2025-04-14 ENCOUNTER — HOSPITAL ENCOUNTER (OUTPATIENT)
Dept: RADIATION ONCOLOGY | Age: 70
Discharge: HOME | End: 2025-04-14
Payer: MEDICARE

## 2025-04-14 PROCEDURE — 77014 CHG CT GUIDANCE RADIATION THERAPY FLDS PLACEMENT: CPT | Performed by: FAMILY MEDICINE

## 2025-04-14 PROCEDURE — 77386 HC NTSTY MODUL RAD TX DLVR CPLX: CPT | Performed by: RADIOLOGY

## 2025-04-15 ENCOUNTER — HOSPITAL ENCOUNTER (OUTPATIENT)
Dept: RADIATION ONCOLOGY | Age: 70
Discharge: HOME | End: 2025-04-15
Payer: MEDICARE

## 2025-04-15 PROCEDURE — 77336 RADIATION PHYSICS CONSULT: CPT | Performed by: RADIOLOGY

## 2025-04-15 PROCEDURE — 77386 HC NTSTY MODUL RAD TX DLVR CPLX: CPT | Performed by: RADIOLOGY

## 2025-04-15 PROCEDURE — 77014 CHG CT GUIDANCE RADIATION THERAPY FLDS PLACEMENT: CPT | Performed by: RADIOLOGY

## 2025-04-15 NOTE — PROGRESS NOTES
Nutrition Note    Met with pt today, pt completed her xrt.   States she has been trying to eat more this week.  Continues to take nausea meds with good control. Drinks water regularly throughout the day, has water with her now, reports good fluid intake.  However, pt reports constipation over past couple of weeks.  Pt does have Eating Hints booklet with recommendations. Taking Miralax daily and just started taking senna-s once per day without relief.  Recommend pt review the dosing on the box for senna-s as she can increase that dose.  Also discussed Colace as another option per our laxative protocol.  Pt states she will gradually increase senna-s as directed and f/u with medical oncology. Pt has chemo Monday.  Also instructed pt on taste changes as she experiences metallic taste from chemo at times. Also encouraged plastic utensils with these taste changes.   Pt encouraged to contact this RD prn    Electronically signed by Isabella Lema RD, LD on 4/15/25 at 10:37 AM EDT    Contact: [phone]

## 2025-04-15 NOTE — PROGRESS NOTES
Patient completed radiation treatment today. Written discharge instructions given and reviewed with patient. Follow up visit with Dr. Fuller is scheduled for 5/13/25. Encouraged patient to call with any questions or concerns int the interim.

## 2025-04-15 NOTE — PROGRESS NOTES
[facility]           Radiation Oncology      [address] [id number]        O: [phone]        F: [phone]       [facility]                   Dr. Asad Fuller MD PhD    RADIATION TREATMENT SUMMARY NOTE     Date of Service: 4/15/2025  Patient ID: Shaylee Jordan   : 1955  MRN: [id number]   Acct Number: [id number]       Patient Name:  Shaylee Jordan,  1955,  69 y.o., female       Referring Physician: Fariba Waite MD  [address] [facility]  [address] [place] [address]      PCP: Yossi Carpio DO       Diagnosis: Malignant neoplasm of upper lobe of right lung (HCC)  Staging form: Lung, AJCC 8th Edition  - Clinical: Stage IIIA (cT1c, cN2, cM0) - Signed by Asad Fuller MD on 2/15/2025        Recent History:  ***    Site Start TX Last TX ED Fractions Dose Fx Dose Technique   Right Lung  3/4/25  3/27/25  23      18  3600 cGy   200 cGy  VMAT   Right Lung 2  3/31/25  4/15/25  15      12  2400 cGy   200 cGy  VMAT     Concurrent therapy: Cisplatin and Etoposide three days per week every 3 weeks.    Technique:                 ***      Treatment course:       ***    Plan:  ***         Asad Fuller MD PhD  Radiation Oncologist, [facility]    Department of Radiation Oncology  [facility]

## 2025-04-16 ENCOUNTER — APPOINTMENT (OUTPATIENT)
Dept: RADIATION ONCOLOGY | Age: 70
End: 2025-04-16
Payer: MEDICARE

## 2025-05-22 DIAGNOSIS — Z00.00 WELL ADULT EXAM: ICD-10-CM

## 2025-06-10 ENCOUNTER — TRANSCRIBE ORDERS (OUTPATIENT)
Dept: ADMINISTRATIVE | Age: 70
End: 2025-06-10

## 2025-06-10 DIAGNOSIS — C34.81 MALIGNANT NEOPLASM OF OVERLAPPING SITES OF RIGHT BRONCHUS AND LUNG (HCC): Primary | ICD-10-CM

## 2025-06-12 ENCOUNTER — HOSPITAL ENCOUNTER (OUTPATIENT)
Dept: RADIOLOGY | Facility: HOSPITAL | Age: 70
Discharge: HOME | End: 2025-06-12
Payer: MEDICARE

## 2025-06-12 DIAGNOSIS — C34.81 MALIGNANT NEOPLASM OF OVERLAPPING SITES OF RIGHT BRONCHUS AND LUNG (MULTI): ICD-10-CM

## 2025-06-12 PROCEDURE — 74176 CT ABD & PELVIS W/O CONTRAST: CPT

## 2025-06-27 ENCOUNTER — HOSPITAL ENCOUNTER (OUTPATIENT)
Dept: RADIATION ONCOLOGY | Age: 70
Discharge: HOME | End: 2025-06-27
Payer: MEDICARE

## 2025-06-27 VITALS
TEMPERATURE: 96.8 F | BODY MASS INDEX: 26.92 KG/M2 | DIASTOLIC BLOOD PRESSURE: 60 MMHG | HEART RATE: 88 BPM | WEIGHT: 147.2 LBS | RESPIRATION RATE: 18 BRPM | OXYGEN SATURATION: 97 % | SYSTOLIC BLOOD PRESSURE: 123 MMHG

## 2025-06-27 DIAGNOSIS — C34.81 SMALL CELL CARCINOMA OF OVERLAPPING SITES OF RIGHT LUNG (HCC): Primary | ICD-10-CM

## 2025-06-27 PROCEDURE — 99212 OFFICE O/P EST SF 10 MIN: CPT | Performed by: RADIOLOGY

## 2025-06-27 NOTE — PROGRESS NOTES
NURSING ASSESSMENT     Date: 6/27/2025        Patient Name: Shaylee Jordan     YOB: 1955      Age:  69 y.o.      MRN: [id number]       Chaperone [] Yes   [x] No      Advance Directives:   Do you currently have completed advance directives (living will)? [] Yes   [x] No         *If yes, please bring us a copy for your records.  *If no, would you like info or assistance in completing advance directives (living will)?   [] Yes   [x] No    Pain Score: Right shoulder blade/mid back  Pain Score (1-10): 4-5   Pain Location: see above   Pain Duration: intermittent   Pain Management/Control: prescribed oxycodone by Dr Stevenson she states it works well but she only takes it when pain becomes severe.       Is pain affecting your ability to take care of yourself or move throughout your home?                        [] Yes   [x] No    General: No Problems  Patient has gained weight [] Yes   [x] No  Patient has lost weight [] Yes   [x] No  How much weight in pounds and over what length of time:     Eyes (Ophthalmic): Reports \"I don't see as well as I used to\". States she is due in September for next eye exam.     Skin (Dermatological): easy bruising.     ENT: Denies any problems.      Respiratory: ongoing dry cough. Reports shortness of breath when walking up stairs too fast.      Cardiovascular: No Problems      Device   [] Yes   [x] No   Copy of Card Obtained [] Yes   [x] No    Gastrointestinal: Reports intermittent nausea when she is receiving chemotherapy which is controlled with zofran. Intermittent constipation which occurs with chemotherapy. Uses stool softener or metamucil as needed.     Genito-Urinary: No Problems     Breast: No Problems     Musculoskeletal: ongoing arthritis in hips and knees.    Neurological: No Problems      Hematological and Lymphatic: Ongoing anemia. Receiving chemotherapy once every 3 weeks.      Endocrine: No Problems     Gyn History:   Vaginal Bleeding:  none      A 10-point review of

## 2025-07-30 ENCOUNTER — HOSPITAL ENCOUNTER (OUTPATIENT)
Dept: CT IMAGING | Age: 70
Discharge: HOME | End: 2025-08-01
Payer: MEDICARE

## 2025-07-30 DIAGNOSIS — C34.81 SMALL CELL CARCINOMA OF OVERLAPPING SITES OF RIGHT LUNG (HCC): ICD-10-CM

## 2025-07-30 PROCEDURE — 3430000000 HC RX DIAGNOSTIC RADIOPHARMACEUTICAL: Performed by: RADIOLOGY

## 2025-07-30 PROCEDURE — A9609 HC RX DIAGNOSTIC RADIOPHARMACEUTICAL: HCPCS | Performed by: RADIOLOGY

## 2025-07-30 PROCEDURE — 78815 PET IMAGE W/CT SKULL-THIGH: CPT

## 2025-07-30 RX ORDER — FLUDEOXYGLUCOSE F 18 200 MCI/ML
16.4 INJECTION, SOLUTION INTRAVENOUS
Status: COMPLETED | OUTPATIENT
Start: 2025-07-30 | End: 2025-07-30

## 2025-07-30 RX ADMIN — FLUDEOXYGLUCOSE F 18 16.4 MILLICURIE: 200 INJECTION, SOLUTION INTRAVENOUS at 14:35

## 2025-08-06 ENCOUNTER — APPOINTMENT (OUTPATIENT)
Dept: PRIMARY CARE | Facility: CLINIC | Age: 70
End: 2025-08-06
Payer: MEDICARE

## 2025-08-08 ENCOUNTER — HOSPITAL ENCOUNTER (OUTPATIENT)
Dept: RADIATION ONCOLOGY | Age: 70
Discharge: HOME | End: 2025-08-08
Payer: MEDICARE

## 2025-08-08 VITALS
HEART RATE: 101 BPM | RESPIRATION RATE: 16 BRPM | OXYGEN SATURATION: 98 % | DIASTOLIC BLOOD PRESSURE: 66 MMHG | SYSTOLIC BLOOD PRESSURE: 111 MMHG | TEMPERATURE: 97.7 F | BODY MASS INDEX: 27.06 KG/M2 | WEIGHT: 148 LBS

## 2025-08-08 DIAGNOSIS — R91.1 LUNG NODULE: ICD-10-CM

## 2025-08-08 DIAGNOSIS — C34.81 SMALL CELL CARCINOMA OF OVERLAPPING SITES OF RIGHT LUNG (HCC): Primary | ICD-10-CM

## 2025-08-08 PROCEDURE — 99212 OFFICE O/P EST SF 10 MIN: CPT | Performed by: RADIOLOGY

## 2025-08-13 ENCOUNTER — HOSPITAL ENCOUNTER (OUTPATIENT)
Dept: RADIATION ONCOLOGY | Age: 70
End: 2025-08-13
Payer: MEDICARE

## 2025-08-21 ENCOUNTER — APPOINTMENT (OUTPATIENT)
Dept: PRIMARY CARE | Facility: CLINIC | Age: 70
End: 2025-08-21
Payer: MEDICARE

## 2025-08-21 VITALS
SYSTOLIC BLOOD PRESSURE: 102 MMHG | HEART RATE: 90 BPM | OXYGEN SATURATION: 96 % | TEMPERATURE: 97 F | RESPIRATION RATE: 18 BRPM | HEIGHT: 62 IN | DIASTOLIC BLOOD PRESSURE: 62 MMHG | BODY MASS INDEX: 27.05 KG/M2 | WEIGHT: 147 LBS

## 2025-08-21 DIAGNOSIS — G37.9 DEMYELINATING DISEASE OF CENTRAL NERVOUS SYSTEM, UNSPECIFIED: ICD-10-CM

## 2025-08-21 DIAGNOSIS — I10 HYPERTENSION, UNSPECIFIED TYPE: ICD-10-CM

## 2025-08-21 DIAGNOSIS — B59 PNEUMONIA DUE TO PNEUMOCYSTIS JIROVECII, UNSPECIFIED LATERALITY, UNSPECIFIED PART OF LUNG (MULTI): ICD-10-CM

## 2025-08-21 DIAGNOSIS — K59.00 CONSTIPATION, UNSPECIFIED CONSTIPATION TYPE: ICD-10-CM

## 2025-08-21 DIAGNOSIS — M54.16 LUMBAR RADICULOPATHY, RIGHT: ICD-10-CM

## 2025-08-21 DIAGNOSIS — H92.02 OTALGIA OF LEFT EAR: ICD-10-CM

## 2025-08-21 DIAGNOSIS — D50.8 OTHER IRON DEFICIENCY ANEMIA: ICD-10-CM

## 2025-08-21 DIAGNOSIS — E83.42 HYPOMAGNESEMIA: ICD-10-CM

## 2025-08-21 DIAGNOSIS — E78.5 HYPERLIPIDEMIA, UNSPECIFIED HYPERLIPIDEMIA TYPE: ICD-10-CM

## 2025-08-21 DIAGNOSIS — I10 PRIMARY HYPERTENSION: ICD-10-CM

## 2025-08-21 DIAGNOSIS — H60.21 ACUTE MALIGNANT OTITIS EXTERNA OF RIGHT EAR: ICD-10-CM

## 2025-08-21 DIAGNOSIS — J44.9 CHRONIC OBSTRUCTIVE PULMONARY DISEASE, UNSPECIFIED COPD TYPE (MULTI): ICD-10-CM

## 2025-08-21 DIAGNOSIS — R05.8 RECURRENT COUGH: ICD-10-CM

## 2025-08-21 DIAGNOSIS — Z13.6 SCREENING FOR CARDIOVASCULAR CONDITION: Primary | ICD-10-CM

## 2025-08-21 PROBLEM — C34.81: Status: ACTIVE | Noted: 2025-02-15

## 2025-08-21 PROBLEM — Z79.69 PATIENT ON COMBINED CHEMOTHERAPY AND RADIATION: Status: ACTIVE | Noted: 2025-08-21

## 2025-08-21 PROBLEM — Z78.9 PATIENT ON COMBINED CHEMOTHERAPY AND RADIATION: Status: ACTIVE | Noted: 2025-08-21

## 2025-08-21 PROCEDURE — 3008F BODY MASS INDEX DOCD: CPT | Performed by: FAMILY MEDICINE

## 2025-08-21 PROCEDURE — 1159F MED LIST DOCD IN RCRD: CPT | Performed by: FAMILY MEDICINE

## 2025-08-21 PROCEDURE — 99214 OFFICE O/P EST MOD 30 MIN: CPT | Performed by: FAMILY MEDICINE

## 2025-08-21 PROCEDURE — 3078F DIAST BP <80 MM HG: CPT | Performed by: FAMILY MEDICINE

## 2025-08-21 PROCEDURE — 3074F SYST BP LT 130 MM HG: CPT | Performed by: FAMILY MEDICINE

## 2025-08-21 RX ORDER — FLUTICASONE FUROATE, UMECLIDINIUM BROMIDE AND VILANTEROL TRIFENATATE 200; 62.5; 25 UG/1; UG/1; UG/1
1 POWDER RESPIRATORY (INHALATION) DAILY
Qty: 90 EACH | Refills: 3 | Status: SHIPPED | OUTPATIENT
Start: 2025-08-21 | End: 2026-08-21

## 2025-08-21 RX ORDER — ALBUTEROL SULFATE 0.83 MG/ML
2.5 SOLUTION RESPIRATORY (INHALATION) EVERY 6 HOURS PRN
Qty: 180 ML | Refills: 3 | Status: SHIPPED | OUTPATIENT
Start: 2025-08-21 | End: 2026-08-21

## 2025-08-21 RX ORDER — ALBUTEROL SULFATE 90 UG/1
2 INHALANT RESPIRATORY (INHALATION) EVERY 6 HOURS PRN
COMMUNITY

## 2025-08-21 RX ORDER — DOXYCYCLINE 100 MG/1
100 CAPSULE ORAL 2 TIMES DAILY
Qty: 14 CAPSULE | Refills: 1 | Status: SHIPPED | OUTPATIENT
Start: 2025-08-21 | End: 2025-08-28

## 2025-08-21 RX ORDER — DULOXETIN HYDROCHLORIDE 60 MG/1
60 CAPSULE, DELAYED RELEASE ORAL DAILY
Qty: 90 CAPSULE | Refills: 3 | Status: SHIPPED | OUTPATIENT
Start: 2025-08-21 | End: 2026-08-21

## 2025-08-21 RX ORDER — LISINOPRIL AND HYDROCHLOROTHIAZIDE 12.5; 2 MG/1; MG/1
1 TABLET ORAL DAILY
Qty: 90 TABLET | Refills: 3 | Status: SHIPPED | OUTPATIENT
Start: 2025-08-21

## 2025-08-21 RX ORDER — OFLOXACIN 3 MG/ML
5 SOLUTION AURICULAR (OTIC) 2 TIMES DAILY
Qty: 10 ML | Refills: 0 | Status: SHIPPED | OUTPATIENT
Start: 2025-08-21 | End: 2025-08-28

## 2025-08-21 RX ORDER — AMOXICILLIN AND CLAVULANATE POTASSIUM 875; 125 MG/1; MG/1
875 TABLET, FILM COATED ORAL 2 TIMES DAILY
Qty: 14 TABLET | Refills: 1 | Status: SHIPPED | OUTPATIENT
Start: 2025-08-21 | End: 2025-09-04

## 2025-08-21 RX ORDER — DICYCLOMINE HYDROCHLORIDE 20 MG/1
10 TABLET ORAL 2 TIMES DAILY
Qty: 30 TABLET | Refills: 1 | Status: SHIPPED | OUTPATIENT
Start: 2025-08-21 | End: 2025-10-20

## 2025-08-21 RX ORDER — SIMVASTATIN 40 MG/1
40 TABLET, FILM COATED ORAL NIGHTLY
Qty: 90 TABLET | Refills: 3 | Status: SHIPPED | OUTPATIENT
Start: 2025-08-21

## 2025-08-21 RX ORDER — PREDNISONE 20 MG/1
40 TABLET ORAL DAILY
Qty: 10 TABLET | Refills: 2 | Status: SHIPPED | OUTPATIENT
Start: 2025-08-21 | End: 2025-08-26

## 2025-08-22 PROBLEM — H92.02 OTALGIA OF LEFT EAR: Status: ACTIVE | Noted: 2025-08-22

## 2025-08-28 ENCOUNTER — APPOINTMENT (OUTPATIENT)
Dept: PRIMARY CARE | Facility: CLINIC | Age: 70
End: 2025-08-28
Payer: MEDICARE

## 2025-09-04 ENCOUNTER — OFFICE VISIT (OUTPATIENT)
Dept: ORTHOPEDIC SURGERY | Facility: CLINIC | Age: 70
End: 2025-09-04
Payer: MEDICARE

## 2025-09-04 ENCOUNTER — HOSPITAL ENCOUNTER (OUTPATIENT)
Dept: RADIOLOGY | Facility: CLINIC | Age: 70
Discharge: HOME | End: 2025-09-04
Payer: MEDICARE

## 2025-09-04 DIAGNOSIS — M70.61 GREATER TROCHANTERIC BURSITIS OF RIGHT HIP: ICD-10-CM

## 2025-09-04 PROCEDURE — 20610 DRAIN/INJ JOINT/BURSA W/O US: CPT | Mod: RT | Performed by: ORTHOPAEDIC SURGERY

## 2025-09-04 PROCEDURE — 99212 OFFICE O/P EST SF 10 MIN: CPT | Mod: 25 | Performed by: ORTHOPAEDIC SURGERY

## 2025-09-04 PROCEDURE — 73502 X-RAY EXAM HIP UNI 2-3 VIEWS: CPT | Mod: RT

## 2025-09-04 PROCEDURE — 2500000004 HC RX 250 GENERAL PHARMACY W/ HCPCS (ALT 636 FOR OP/ED): Performed by: ORTHOPAEDIC SURGERY

## 2025-09-04 RX ORDER — BETAMETHASONE SODIUM PHOSPHATE AND BETAMETHASONE ACETATE 3; 3 MG/ML; MG/ML
2 INJECTION, SUSPENSION INTRA-ARTICULAR; INTRALESIONAL; INTRAMUSCULAR; SOFT TISSUE
Status: COMPLETED | OUTPATIENT
Start: 2025-09-04 | End: 2025-09-04

## 2025-09-04 RX ORDER — LIDOCAINE HYDROCHLORIDE 10 MG/ML
5 INJECTION, SOLUTION INFILTRATION; PERINEURAL
Status: COMPLETED | OUTPATIENT
Start: 2025-09-04 | End: 2025-09-04

## 2025-09-04 RX ADMIN — LIDOCAINE HYDROCHLORIDE 5 ML: 10 INJECTION, SOLUTION INFILTRATION; PERINEURAL at 16:58

## 2025-09-04 RX ADMIN — BETAMETHASONE SODIUM PHOSPHATE AND BETAMETHASONE ACETATE 2 ML: 3; 3 INJECTION, SUSPENSION INTRA-ARTICULAR; INTRALESIONAL; INTRAMUSCULAR at 16:58

## 2025-09-17 ENCOUNTER — APPOINTMENT (OUTPATIENT)
Dept: PRIMARY CARE | Facility: CLINIC | Age: 70
End: 2025-09-17
Payer: MEDICARE

## 2025-10-07 ENCOUNTER — APPOINTMENT (OUTPATIENT)
Dept: NEPHROLOGY | Facility: CLINIC | Age: 70
End: 2025-10-07
Payer: MEDICARE

## 2026-03-04 ENCOUNTER — APPOINTMENT (OUTPATIENT)
Dept: ORTHOPEDIC SURGERY | Facility: CLINIC | Age: 71
End: 2026-03-04
Payer: MEDICARE

## 2026-03-05 ENCOUNTER — APPOINTMENT (OUTPATIENT)
Dept: ORTHOPEDIC SURGERY | Facility: CLINIC | Age: 71
End: 2026-03-05
Payer: MEDICARE

## (undated) DEVICE — NEEDLE ASPIR 22GA L2.4MM SHTH DIA1.52MM ENDO US EXPECT SLM

## (undated) DEVICE — NEEDLE CYTO 21GA L137MM DIA1.9MM PULM BRAID TAPR SHTH OPT 5PK

## (undated) DEVICE — KIT PROCEDURE ISSUMISITE CATHETER 45

## (undated) DEVICE — SINGLE PORT MANIFOLD: Brand: NEPTUNE 2

## (undated) DEVICE — SYRINGE MED 3ML NDL 18GA L1.5IN BLNT FILL LUERLOCK TIP DISP

## (undated) DEVICE — BRUSH CYTO FLX DISP SUPERDIMENSION

## (undated) DEVICE — TOWEL,OR,DSP,ST,BLUE,STD,4/PK,20PK/CS: Brand: MEDLINE

## (undated) DEVICE — BITE BLOCK ENDOSCP AD 60 FR W/ ADJ STRP PLAS GRN BLOX

## (undated) DEVICE — SYRINGE MED 30ML SLIP TIP BLNT FILL AND LUERLOCK DISP

## (undated) DEVICE — SPONGE GZ W4XL4IN RAYON POLY CVR W/NONWOVEN FAB STRL 2/PK

## (undated) DEVICE — TUBING, SUCTION, 1/4" X 20', STRAIGHT: Brand: MEDLINE INDUSTRIES, INC.

## (undated) DEVICE — GOWN,SIRUS,NONRNF,SETINSLV,2XL,18/CS: Brand: MEDLINE

## (undated) DEVICE — PAD N ADH W3XL4IN POLY COT SFT PERF FLM EASILY CUT ABSRB

## (undated) DEVICE — SYRINGE, LUER SLIP, STERILE, 5ML: Brand: MEDLINE INDUSTRIES, INC.

## (undated) DEVICE — HYPODERMIC SAFETY NEEDLE: Brand: MAGELLAN

## (undated) DEVICE — SINGLE USE SUCTION VALVE MAJ-209: Brand: SINGLE USE SUCTION VALVE (STERILE)

## (undated) DEVICE — SYRINGE MED 10ML SLIP TIP BLNT FILL AND LUERLOCK DISP

## (undated) DEVICE — ENDOSCOPIC TRAY TRNSPRT 20.5X16.5X4.1 IN RECYCL SUGAR PULP

## (undated) DEVICE — COVER,TABLE,44X90,STERILE: Brand: MEDLINE

## (undated) DEVICE — STOPCOCK 3-WAY 45 PSI LOW OFF ROT COLAR

## (undated) DEVICE — FORCEPS L110MM DIA1.7MM PREMARKED REINF SHFT

## (undated) DEVICE — GOWN,AURORA,NONREINFORCED,LARGE: Brand: MEDLINE

## (undated) DEVICE — BRUSH ENDO CLN L90.5IN SHTH DIA1.7MM BRIST DIA5-7MM 2-6MM

## (undated) DEVICE — CONTAINER,SPECIMEN,OR STERILE,4OZ: Brand: MEDLINE

## (undated) DEVICE — Device: Brand: BALLOON

## (undated) DEVICE — MAJ-1414 SINGLE USE ADPATER BIOPSY VALV: Brand: SINGLE USE ADAPTOR BIOPSY VALVE

## (undated) DEVICE — GLOVE ORANGE PI 8 1/2   MSG9085

## (undated) DEVICE — LABEL MED MINI W/ MARKER

## (undated) DEVICE — DRESSING,GAUZE,PETROLATUM,CURAD,1"X8",ST: Brand: CURAD

## (undated) DEVICE — ADAPTER TBNG DIA15MM SWVL FBROPT BRONCHSCP TERM 2 AXIS PEEP

## (undated) DEVICE — SHEET,DRAPE,53X77,STERILE: Brand: MEDLINE